# Patient Record
Sex: MALE | Race: WHITE | NOT HISPANIC OR LATINO | Employment: FULL TIME | URBAN - METROPOLITAN AREA
[De-identification: names, ages, dates, MRNs, and addresses within clinical notes are randomized per-mention and may not be internally consistent; named-entity substitution may affect disease eponyms.]

---

## 2017-05-20 ENCOUNTER — DOCUMENTATION ONLY (OUTPATIENT)
Dept: LAB | Facility: CLINIC | Age: 52
End: 2017-05-20

## 2017-05-20 DIAGNOSIS — Z12.5 SPECIAL SCREENING FOR MALIGNANT NEOPLASM OF PROSTATE: ICD-10-CM

## 2017-05-20 DIAGNOSIS — Z13.6 CARDIOVASCULAR SCREENING; LDL GOAL LESS THAN 160: Primary | ICD-10-CM

## 2017-05-20 NOTE — PROGRESS NOTES
This patient has a lab only appointment on 5/26/2017 but does not have future orders. Please review, associate diagnosis and sign pending lab orders for the upcoming appointment.  He has an appointment with Dr. Bobo on 6/2/2017.    Thank you,    [facility]

## 2017-05-23 NOTE — PROGRESS NOTES
Can someone tell me why a PSA has been prompted? Did the patient request this? There are no notes to suggest that.

## 2017-05-26 DIAGNOSIS — Z13.6 CARDIOVASCULAR SCREENING; LDL GOAL LESS THAN 160: ICD-10-CM

## 2017-05-26 DIAGNOSIS — Z12.5 SPECIAL SCREENING FOR MALIGNANT NEOPLASM OF PROSTATE: ICD-10-CM

## 2017-05-26 LAB
ALBUMIN SERPL-MCNC: 4.1 G/DL (ref 3.4–5)
ALP SERPL-CCNC: 60 U/L (ref 40–150)
ALT SERPL W P-5'-P-CCNC: 30 U/L (ref 0–70)
ANION GAP SERPL CALCULATED.3IONS-SCNC: 7 MMOL/L (ref 3–14)
AST SERPL W P-5'-P-CCNC: 19 U/L (ref 0–45)
BILIRUB SERPL-MCNC: 0.7 MG/DL (ref 0.2–1.3)
BUN SERPL-MCNC: 12 MG/DL (ref 7–30)
CALCIUM SERPL-MCNC: 9.1 MG/DL (ref 8.5–10.1)
CHLORIDE SERPL-SCNC: 105 MMOL/L (ref 94–109)
CHOLEST SERPL-MCNC: 231 MG/DL
CO2 SERPL-SCNC: 28 MMOL/L (ref 20–32)
CREAT SERPL-MCNC: 0.98 MG/DL (ref 0.66–1.25)
GFR SERPL CREATININE-BSD FRML MDRD: 81 ML/MIN/1.7M2
GLUCOSE SERPL-MCNC: 93 MG/DL (ref 70–99)
HDLC SERPL-MCNC: 58 MG/DL
LDLC SERPL CALC-MCNC: 142 MG/DL
NONHDLC SERPL-MCNC: 173 MG/DL
POTASSIUM SERPL-SCNC: 3.8 MMOL/L (ref 3.4–5.3)
PROT SERPL-MCNC: 7.6 G/DL (ref 6.8–8.8)
SODIUM SERPL-SCNC: 140 MMOL/L (ref 133–144)
TRIGL SERPL-MCNC: 154 MG/DL

## 2017-05-26 PROCEDURE — 80061 LIPID PANEL: CPT | Performed by: FAMILY MEDICINE

## 2017-05-26 PROCEDURE — 80053 COMPREHEN METABOLIC PANEL: CPT | Performed by: FAMILY MEDICINE

## 2017-05-26 PROCEDURE — 36415 COLL VENOUS BLD VENIPUNCTURE: CPT | Performed by: FAMILY MEDICINE

## 2017-05-26 NOTE — PROGRESS NOTES
I have reviewed the schedule of future appointments and this patient has an appointment scheduled for 6-2-2017.    Visit date not found

## 2017-06-02 ENCOUNTER — OFFICE VISIT (OUTPATIENT)
Dept: FAMILY MEDICINE | Facility: CLINIC | Age: 52
End: 2017-06-02
Payer: COMMERCIAL

## 2017-06-02 VITALS
TEMPERATURE: 97.6 F | WEIGHT: 192 LBS | HEIGHT: 69 IN | HEART RATE: 66 BPM | BODY MASS INDEX: 28.44 KG/M2 | SYSTOLIC BLOOD PRESSURE: 129 MMHG | DIASTOLIC BLOOD PRESSURE: 80 MMHG

## 2017-06-02 DIAGNOSIS — Z12.11 COLON CANCER SCREENING: ICD-10-CM

## 2017-06-02 DIAGNOSIS — H91.93 HEARING DIFFICULTY, BILATERAL: ICD-10-CM

## 2017-06-02 DIAGNOSIS — Z00.00 ROUTINE GENERAL MEDICAL EXAMINATION AT A HEALTH CARE FACILITY: Primary | ICD-10-CM

## 2017-06-02 PROCEDURE — 99396 PREV VISIT EST AGE 40-64: CPT | Performed by: FAMILY MEDICINE

## 2017-06-02 RX ORDER — LORATADINE 10 MG/1
10 TABLET ORAL DAILY
COMMUNITY

## 2017-06-02 NOTE — PATIENT INSTRUCTIONS
"  Preventive Health Recommendations  Male Ages 50   64    Yearly exam:             See your health care provider every year in order to  o   Review health changes.   o   Discuss preventive care.    o   Review your medicines if your doctor has prescribed any.     Have a cholesterol test every 5 years, or more frequently if you are at risk for high cholesterol/heart disease.     Have a diabetes test (fasting glucose) every three years. If you are at risk for diabetes, you should have this test more often.     Have a colonoscopy at age 50, or have a yearly FIT test (stool test). These exams will check for colon cancer.      Talk with your health care provider about whether or not a prostate cancer screening test (PSA) is right for you.    You should be tested each year for STDs (sexually transmitted diseases), if you re at risk.     Shots: Get a flu shot each year. Get a tetanus shot every 10 years.     Nutrition:    Eat at least 5 servings of fruits and vegetables daily.     Eat whole-grain bread, whole-wheat pasta and brown rice instead of white grains and rice.     Talk to your provider about Calcium and Vitamin D.     Lifestyle    Exercise for at least 150 minutes a week (30 minutes a day, 5 days a week). This will help you control your weight and prevent disease.     Limit alcohol to one drink per day.     No smoking.     Wear sunscreen to prevent skin cancer.     See your dentist every six months for an exam and cleaning.     See your eye doctor every 1 to 2 years.      Triglycerides  Does this test have other names?  Lipid panel, fasting lipoprotein panel  What is this test?  This test measures the amount of triglycerides in your blood.  Triglycerides are one of several types of fats in your blood. Other kinds are LDL (\"bad\") cholesterol and HDL (\"good\") cholesterol.  Knowing your triglyceride level is important, especially if you have diabetes, are overweight or a smoker, or are mostly inactive. High " triglyceride levels may put you at greater risk for a heart attack or stroke.    This test is part of a group of cholesterol and blood fat tests called a fasting lipoprotein panel, or lipid panel. This panel is recommended for all adults at least once every 5 years, or as recommended by your healthcare provider.  Knowing your triglyceride level helps your healthcare provider suggest healthy changes to your diet or lifestyle. If you have triglycerides that are high to very high, your provider is more likely to prescribe medicines to lower your triglycerides or your LDL cholesterol.  Why do I need this test?  You may have this test as part of a routine checkup. You may also need this test if you're overweight, drink too much alcohol, rarely exercise, or have other conditions like high blood pressure or diabetes.  If you are on cholesterol-lowering medicines, you may have this test to see how well your treatment is working.  What other tests might I have along with this test?  Your healthcare provider will order screening tests for LDL, HDL, and total cholesterol.  What do my test results mean?  Many things may affect your lab test results. These include the method each lab uses to do the test. Even if your test results are different from the normal value, you may not have a problem. To learn what the results mean for you, talk with your healthcare provider.  Results are given in milligrams per deciliter (mg/dL). Normal levels of triglycerides are less than 150 mg/dL.  Here are how higher numbers are classified:    150 to 199 mg/dL: Borderline high    200 to 499 mg/dL: High    500 mg/dL and above: Very high  If you have a high triglyceride level, you have a greater risk for heart attack and stroke.  A triglyceride level above 150 mg/dL also means that you may have an increased risk for metabolic syndrome. This is a cluster of symptoms including high blood pressure, high blood sugar, and high body fat around the waist.  These symptoms have been linked to increased risk for diabetes, heart disease, and stroke.  High triglycerides levels can also be caused by certain diseases or inherited conditions.  If your triglyceride level is above 200 mg/dL, your healthcare provider may recommend that you:    Lose weight    Limit high-fat foods containing saturated fats. These are animal fats found in meat, butter, and whole milk.    Limit trans fats, which are found in many processed foods like chips and store-bought cookies    Cut back on drinks with added sugars, such as soda    Limit your alcohol intake    Stop smoking    Control your blood pressure    Exercise for at least 30 minutes a day, 5 days a week    Limit the calories from fat in your diet to 25% to 35% of your total intake  If your triglycerides are extremely high above 500 mg/dL you may have an added risk for problems with your pancreas. You will likely need medicine to lower your levels along with recommended changes in diet and lifestyle.  How is this test done?  The test requires a blood sample, which is drawn through a needle from a vein in your arm.  Does this test pose any risks?  Taking a blood sample with a needle carries risks that include bleeding, infection, bruising, or feeling dizzy. When the needle pricks your arm, you may feel a slight stinging sensation or pain. Afterward, the site may be slightly sore.  What might affect my test results?  Not fasting for the required length of time before the test can affect your results. Certain medicines can affect your results, as can drinking alcohol.  How do I prepare for the test?  If you have this test as part of a cholesterol screening, you will need to not eat or drink anything but water for 9 to 14 hours before the test. In addition, be sure your healthcare provider knows about all medicines, herbs, vitamins, and supplements you are taking. This includes medicines that don't need a prescription and any illicit drugs you  may use.       6824-2908 [employer]. [address] [place] [address], [place] [address]. All rights reserved. This information is not intended as a substitute for professional medical care. Always follow your healthcare professional's instructions.      Your provider has referred you to: [facility]: [facility] [place] [phone]   [email or web address]    Specialty Testing:  Per audiologist

## 2017-06-02 NOTE — MR AVS SNAPSHOT
After Visit Summary   6/2/2017    Boo Medellin    MRN: [id number]           Patient Information     Date Of Birth          1965        Visit Information        Provider Department      6/2/2017 10:00 AM Germán Bobo MD [facility]        Today's Diagnoses     Routine general medical examination at a health care facility    -  1    Colon cancer screening        Hearing difficulty, bilateral          Care Instructions      Preventive Health Recommendations  Male Ages 50 - 64    Yearly exam:             See your health care provider every year in order to  o   Review health changes.   o   Discuss preventive care.    o   Review your medicines if your doctor has prescribed any.     Have a cholesterol test every 5 years, or more frequently if you are at risk for high cholesterol/heart disease.     Have a diabetes test (fasting glucose) every three years. If you are at risk for diabetes, you should have this test more often.     Have a colonoscopy at age 50, or have a yearly FIT test (stool test). These exams will check for colon cancer.      Talk with your health care provider about whether or not a prostate cancer screening test (PSA) is right for you.    You should be tested each year for STDs (sexually transmitted diseases), if you re at risk.     Shots: Get a flu shot each year. Get a tetanus shot every 10 years.     Nutrition:    Eat at least 5 servings of fruits and vegetables daily.     Eat whole-grain bread, whole-wheat pasta and brown rice instead of white grains and rice.     Talk to your provider about Calcium and Vitamin D.     Lifestyle    Exercise for at least 150 minutes a week (30 minutes a day, 5 days a week). This will help you control your weight and prevent disease.     Limit alcohol to one drink per day.     No smoking.     Wear sunscreen to prevent skin cancer.     See your dentist every six months for an exam and cleaning.     See your eye doctor every 1 to  "2 years.      Triglycerides  Does this test have other names?  Lipid panel, fasting lipoprotein panel  What is this test?  This test measures the amount of triglycerides in your blood.  Triglycerides are one of several types of fats in your blood. Other kinds are LDL (\"bad\") cholesterol and HDL (\"good\") cholesterol.  Knowing your triglyceride level is important, especially if you have diabetes, are overweight or a smoker, or are mostly inactive. High triglyceride levels may put you at greater risk for a heart attack or stroke.    This test is part of a group of cholesterol and blood fat tests called a fasting lipoprotein panel, or lipid panel. This panel is recommended for all adults at least once every 5 years, or as recommended by your healthcare provider.  Knowing your triglyceride level helps your healthcare provider suggest healthy changes to your diet or lifestyle. If you have triglycerides that are high to very high, your provider is more likely to prescribe medicines to lower your triglycerides or your LDL cholesterol.  Why do I need this test?  You may have this test as part of a routine checkup. You may also need this test if you're overweight, drink too much alcohol, rarely exercise, or have other conditions like high blood pressure or diabetes.  If you are on cholesterol-lowering medicines, you may have this test to see how well your treatment is working.  What other tests might I have along with this test?  Your healthcare provider will order screening tests for LDL, HDL, and total cholesterol.  What do my test results mean?  Many things may affect your lab test results. These include the method each lab uses to do the test. Even if your test results are different from the normal value, you may not have a problem. To learn what the results mean for you, talk with your healthcare provider.  Results are given in milligrams per deciliter (mg/dL). Normal levels of triglycerides are less than 150 " mg/dL.  Here are how higher numbers are classified:    150 to 199 mg/dL: Borderline high    200 to 499 mg/dL: High    500 mg/dL and above: Very high  If you have a high triglyceride level, you have a greater risk for heart attack and stroke.  A triglyceride level above 150 mg/dL also means that you may have an increased risk for metabolic syndrome. This is a cluster of symptoms including high blood pressure, high blood sugar, and high body fat around the waist. These symptoms have been linked to increased risk for diabetes, heart disease, and stroke.  High triglycerides levels can also be caused by certain diseases or inherited conditions.  If your triglyceride level is above 200 mg/dL, your healthcare provider may recommend that you:    Lose weight    Limit high-fat foods containing saturated fats. These are animal fats found in meat, butter, and whole milk.    Limit trans fats, which are found in many processed foods like chips and store-bought cookies    Cut back on drinks with added sugars, such as soda    Limit your alcohol intake    Stop smoking    Control your blood pressure    Exercise for at least 30 minutes a day, 5 days a week    Limit the calories from fat in your diet to 25% to 35% of your total intake  If your triglycerides are extremely high--above 500 mg/dL--you may have an added risk for problems with your pancreas. You will likely need medicine to lower your levels along with recommended changes in diet and lifestyle.  How is this test done?  The test requires a blood sample, which is drawn through a needle from a vein in your arm.  Does this test pose any risks?  Taking a blood sample with a needle carries risks that include bleeding, infection, bruising, or feeling dizzy. When the needle pricks your arm, you may feel a slight stinging sensation or pain. Afterward, the site may be slightly sore.  What might affect my test results?  Not fasting for the required length of time before the test can  affect your results. Certain medicines can affect your results, as can drinking alcohol.  How do I prepare for the test?  If you have this test as part of a cholesterol screening, you will need to not eat or drink anything but water for 9 to 14 hours before the test. In addition, be sure your healthcare provider knows about all medicines, herbs, vitamins, and supplements you are taking. This includes medicines that don't need a prescription and any illicit drugs you may use.       8072-4591 [employer]. [address] [place] [address]. All rights reserved. This information is not intended as a substitute for professional medical care. Always follow your healthcare professional's instructions.      Your provider has referred you to: [facility]: [facility] [phone]   [email or web address]    Specialty Testing:  Per audiologist            Follow-ups after your visit        Additional Services     AUDIOLOGY ADULT REFERRAL       Your provider has referred you to: [facility]: [facility] [phone]   [email or web address]    Specialty Testing:  Per audiologist                  Follow-up notes from your care team     Return in about 1 year (around 6/2/2018) for Physical Exam.      Future tests that were ordered for you today     Open Future Orders        Priority Expected Expires Ordered    Fecal colorectal cancer screen FIT Routine 6/23/2017 8/25/2017 6/2/2017            Who to contact     If you have questions or need follow up information about today's clinic visit or your schedule please contact [facility] directly at [phone].  Normal or non-critical lab and imaging results will be communicated to you by MyChart, letter or phone within 4 business days after the clinic has received the results. If you do not hear from us within 7 days, please contact the clinic through MyChart or phone. If  "you have a critical or abnormal lab result, we will notify you by phone as soon as possible.  Submit refill requests through [employer] or call your pharmacy and they will forward the refill request to us. Please allow 3 business days for your refill to be completed.          Additional Information About Your Visit        [employer] Information     [employer] lets you send messages to your doctor, view your test results, renew your prescriptions, schedule appointments and more. To sign up, go to [email or web address] . Click on \"Log in\" on the left side of the screen, which will take you to the Welcome page. Then click on \"Sign up Now\" on the right side of the page.     You will be asked to enter the access code listed below, as well as some personal information. Please follow the directions to create your username and password.     Your access code is: [email or web address]  Expires: 2017  7:59 PM     Your access code will  in 90 days. If you need help or a new code, please call your [facility] or [phone].        Care EveryWhere ID     This is your Care EveryWhere ID. This could be used by other organizations to access your [facility] medical records  [id number]        Your Vitals Were     Pulse Temperature Height BMI (Body Mass Index)          66 97.6  F (36.4  C) (Oral) 5' 8.75\" (1.746 m) 28.56 kg/m2         Blood Pressure from Last 3 Encounters:   17 129/80   12/21/15 118/77   14 125/79    Weight from Last 3 Encounters:   17 192 lb (87.1 kg)   12/21/15 187 lb 12.8 oz (85.2 kg)   14 189 lb (85.7 kg)              We Performed the Following     AUDIOLOGY ADULT REFERRAL        Primary Care Provider Office Phone # Fax #    Germán Bobo [phone]       [facility] [address] [address] [facility] [address]        Thank you!     Thank you for choosing [facility]  for your care. Our goal is always to provide you with excellent care. Hearing back " from our patients is one way we can continue to improve our services. Please take a few minutes to complete the written survey that you may receive in the mail after your visit with us. Thank you!             Your Updated Medication List - Protect others around you: Learn how to safely use, store and throw away your medicines at www.disposemymeds.org.          This list is accurate as of: 6/2/17 10:52 AM.  Always use your most recent med list.                   Brand Name Dispense Instructions for use    Fish Oil 1200 MG Caps      Take 2 capsules by mouth daily.       loratadine 10 MG tablet    CLARITIN     Take 10 mg by mouth daily       Multi-vitamin Tabs tablet   Generic drug:  multivitamin, therapeutic with minerals      Take 1 tablet by mouth daily.

## 2017-06-02 NOTE — NURSING NOTE
"Chief Complaint   Patient presents with     Physical       Initial /80  Pulse 66  Temp 97.6  F (36.4  C) (Oral)  Ht 5' 8.75\" (1.746 m)  Wt 192 lb (87.1 kg)  BMI 28.56 kg/m2 Estimated body mass index is 28.56 kg/(m^2) as calculated from the following:    Height as of this encounter: 5' 8.75\" (1.746 m).    Weight as of this encounter: 192 lb (87.1 kg).  Medication Reconciliation: dimas Olguin M.A.      "

## 2017-06-02 NOTE — PROGRESS NOTES
SUBJECTIVE:     CC: Boo Medellin is an 51 year old male who presents for preventative health visit.     Healthy Habits:    Do you get at least three servings of calcium containing foods daily (dairy, green leafy vegetables, etc.)? yes    Amount of exercise or daily activities, outside of work: 1 day(s) per week    Problems taking medications regularly not applicable    Medication side effects: No    Have you had an eye exam in the past two years? yes    Do you see a dentist twice per year? yes    Do you have sleep apnea, excessive snoring or daytime drowsiness?no            Today's PHQ-2 Score:   PHQ-2 ( 1999 Pfizer) 12/21/2015 11/24/2014   Q1: Little interest or pleasure in doing things 0 0   Q2: Feeling down, depressed or hopeless 0 0   PHQ-2 Score 0 0       Abuse: Current or Past(Physical, Sexual or Emotional)- NO  Do you feel safe in your environment - YES    Social History   Substance Use Topics     Smoking status: Never Smoker     Smokeless tobacco: Never Used     Alcohol use Yes     The patient does not drink >3 drinks per day nor >7 drinks per week.    Last PSA: No results found for: PSA    Recent Labs   Lab Test  05/26/17   0735  11/27/15   0800  11/17/14   0730  11/21/13   0906   CHOL  231*  216*  216*  220*   HDL  58  51  51  42   LDL  142*  120*  137*  144*   TRIG  154*  223*  140  172*   CHOLHDLRATIO   --    --   4.2  5.3*   NHDL  173*  165*   --    --        Reviewed orders with patient. Reviewed health maintenance and updated orders accordingly - Yes    Reviewed and updated as needed this visit by clinical staff  Tobacco  Allergies  Meds  Med Hx  Surg Hx  Fam Hx  Soc Hx        Reviewed and updated as needed this visit by Provider            ROS:    ENT: NEGATIVE for ear, mouth and throat problems except hearing in the office has been more difficulty in the last few years           ASSESSMENT/PLAN:         ICD-10-CM    1. Routine general medical examination at a health care facility Z00.00  "Fecal colorectal cancer screen FIT   2. Colon cancer screening Z12.11 Fecal colorectal cancer screen FIT   3. Hearing difficulty, bilateral H91.93 AUDIOLOGY ADULT REFERRAL       COUNSELING:  Reviewed preventive health counseling, as reflected in patient instructions       Regular exercise       Vision screening       Family planning       Consider Hep C screening for patients born between 1945 and 1965       Osteoporosis Prevention/Bone Health    BP Screening:   Last 3 BP Readings:    BP Readings from Last 3 Encounters:   06/02/17 129/80   12/21/15 118/77   11/24/14 125/79       The following was recommended to the patient:  Re-screen BP within a year and recommended lifestyle modifications     reports that he has never smoked. He has never used smokeless tobacco.    Estimated body mass index is 28.56 kg/(m^2) as calculated from the following:    Height as of this encounter: 5' 8.75\" (1.746 m).    Weight as of this encounter: 192 lb (87.1 kg).   Weight management plan: Discussed healthy diet and exercise guidelines and patient will follow up in 12 months in clinic to re-evaluate.    Counseling Resources:  ATP IV Guidelines  Pooled Cohorts Equation Calculator  FRAX Risk Assessment  ICSI Preventive Guidelines  Dietary Guidelines for Americans, 2010  USDA's MyPlate  ASA Prophylaxis  Lung CA Screening    Germán Bobo MD  [facility]  --------------------------------------------------------------------------------------------------------------------------------------    SUBJECTIVE:  Boo Medellin is a 51 year old male who presents to the clinic today for a routine physical exam.    The patient's last physical was 1 years ago.     Cholesterol   Date Value Ref Range Status   05/26/2017 231 (H) <200 mg/dL Final     Comment:     Desirable:       <200 mg/dl   11/27/2015 216 (H) <200 mg/dL Final     Comment:     Desirable:       <200 mg/dl     HDL Cholesterol   Date Value Ref Range Status   05/26/2017 58 " >39 mg/dL Final   11/27/2015 51 >39 mg/dL Final     LDL Cholesterol Calculated   Date Value Ref Range Status   05/26/2017 142 (H) <100 mg/dL Final     Comment:     Above desirable:  100-129 mg/dl   Borderline High:  130-159 mg/dL   High:             160-189 mg/dL   Very high:       >189 mg/dl     11/27/2015 120 (H) <100 mg/dL Final     Comment:     Above desirable:  100-129 mg/dl   Borderline High:  130-159 mg/dL   High:             160-189 mg/dL   Very high:       >189 mg/dl       Triglycerides   Date Value Ref Range Status   05/26/2017 154 (H) <150 mg/dL Final     Comment:     Borderline high:  150-199 mg/dl   High:             200-499 mg/dl   Very high:       >499 mg/dl   Fasting specimen     11/27/2015 223 (H) <150 mg/dL Final     Comment:     Borderline high:  150-199 mg/dl   High:             200-499 mg/dl   Very high:       >499 mg/dl   Fasting specimen       Cholesterol/HDL Ratio   Date Value Ref Range Status   11/17/2014 4.2 0.0 - 5.0 Final   11/21/2013 5.3 (H) 0.0 - 5.0 Final     The patient's last fasting lipid panel was done 1 weeks ago and the results are listed above.        The 10-year ASCVD risk score (Rice KAILEY Jr, et al., 2013) is: 4.1%    Values used to calculate the score:      Age: 51 years      Sex: Male      Is Non- : No      Diabetic: No      Tobacco smoker: No      Systolic Blood Pressure: 129 mmHg      Is BP treated: No      HDL Cholesterol: 58 mg/dL      Total Cholesterol: 231 mg/dL        The patient reports that he has never been treated for high blood pressure.    The patient reports that he does not take a daily aspirin.    Lab Results   Component Value Date    HCVAB Negative 10/09/2012     The patient reports that he has been screened for Hepatitis C    (Screen all baby boomers once per CDC-- the generation born from 1945 through 1965)    Immunization History   Administered Date(s) Administered     Influenza (IIV3) 09/23/2011, 01/13/2013     TD (ADULT, 7+)  01/13/2003     TDAP Vaccine (Adacel) 09/23/2011     The patient's believes that his last tetanus shot was given 6 year(s) ago.   The patient believes that he has not had a Zostavax in the past  The patient believes that he has not had a PPSV23 in the past.  The patient believes that he has not had a PCV13 in the past.  The patient believes that he has had a seasonal flu vaccination this fall or winter.  The patient would like to have a no vaccinations today      No results found for this or any previous visit.]   The patient denies a family history of colon cancer.  The patient reports that he has not had a colonoscopy. He has done the FIT card in the past and wished to continue(s) that .      The patient reports that he does performs a self testicular exam monthly.  His currently used contraception is a previous vasectomy in the near future.  The patient denies a family history of diabetes.    The patient reports that he eats or drinks 3-5 servings of dairy products per day. He does take a multivitamin with calcium once daily.  The patient reports that he has dental appointments approximately every 6 months.  The patient reports that he  has an eye examination approximately every 1.0 year(s).    Do you currently smoke? No  How many years have you smoked? 0   How many packs per day did you smoke on average? N/A  (if more than 30 pack year history and the patient is age 55-80 consider ordering an annual low dose radiation lung CT to screen for cancer)  (Do not order if patient has quit more than 15 years ago or has a health condition that limits life expectancy or could not tolerate curative lung surgery)  Are you interested having a lung CT to screen for lung cancer? N/A    If the patient has smoked more that 100 cigarettes, has the patient had an imaging study (US or CT) for an AAA between the ages of 65 and 75? N/A            Patient Active Problem List   Diagnosis     HDL lipoprotein deficiency     AR (allergic  "rhinitis)     Benign localized hyperplasia of prostate without urinary obstruction and other lower urinary tract symptoms (LUTS)     High triglycerides     CARDIOVASCULAR SCREENING; LDL GOAL LESS THAN 160       Past Surgical History:   Procedure Laterality Date     HERNIA REPAIR, INGUINAL RT/LT  1967    right       Family History   Problem Relation Age of Onset     Cardiovascular Mother      Alzheimer Disease Mother      Cardiovascular Father      heart attack / pacemaker     CEREBROVASCULAR DISEASE Father      C.A.D. Father      MI at age 64     CANCER Father      central chest     Alzheimer Disease Maternal Grandmother      Cancer - colorectal Maternal Grandfather      at age 85     Alzheimer Disease Paternal Grandfather      Cardiovascular Brother      Alzheimer Disease Paternal Grandmother      Prostate Cancer No family hx of      Anesthesia Reaction No family hx of      Blood Disease No family hx of        Social History     Social History     Marital status:      Spouse name: N/A     Number of children: N/A     Years of education: N/A     Occupational History     Not on file.     Social History Main Topics     Smoking status: Never Smoker     Smokeless tobacco: Never Used     Alcohol use Yes     Drug use: No     Sexual activity: Yes     Partners: Female     Birth control/ protection: Surgical     Other Topics Concern     Not on file     Social History Narrative       Current Outpatient Prescriptions   Medication Sig Dispense Refill     loratadine (CLARITIN) 10 MG tablet Take 10 mg by mouth daily       Multiple Vitamin (MULTI-VITAMIN) per tablet Take 1 tablet by mouth daily.       Omega-3 Fatty Acids (FISH OIL) 1200 MG capsule Take 2 capsules by mouth daily.       PHYSICAL EXAMINATION:  Blood pressure 129/80, pulse 66, temperature 97.6  F (36.4  C), temperature source Oral, height 5' 8.75\" (1.746 m), weight 192 lb (87.1 kg).  General appearance - healthy, alert and no distress  Skin - Skin color, " texture, turgor normal. No rashes or lesions.  Head - Normocephalic. No masses, lesions, tenderness or abnormalities  Eyes - conjunctivae/corneas clear. PERRL, EOM's intact. Fundi benign  Ears - External ears normal. Canals clear. TM's normal.  Nose/Sinuses - Nares normal. Septum midline. Mucosa normal. No drainage or sinus tenderness.  Oropharynx - Lips, mucosa, and tongue normal. Teeth and gums normal.  Neck - Neck supple. No adenopathy. Thyroid symmetric, normal size,  Lungs - Percussion normal. Good diaphragmatic excursion. Lungs clear  Heart - PMI normal. No lifts, heaves, or thrills. RRR. No murmurs, clicks gallops or rub  Abdomen - Abdomen soft, non-tender. BS normal. No masses, organomegaly  Extremities - Extremities normal. No deformities, edema, or skin discoloration.  Musculoskeletal - Spine ROM normal. Muscular strength intact.  Peripheral pulses - radial=4/4, femoral=4/4, popliteal=4/4, dorsalis pedis=4/4,  Neuro - Gait normal. Reflexes normal and symmetric. Sensation grossly WNL.  Genitalia - Penis normal. No urethral discharge. Scrotum normal to palpation. No hernia.  Rectal - Rectal negative. Prostate palpation negative.  No rectal masses or abnormalities, positive findings: prostate 1+      Orders Only on 05/26/2017   Component Date Value Ref Range Status     Cholesterol 05/26/2017 231* <200 mg/dL Final    Desirable:       <200 mg/dl     Triglycerides 05/26/2017 154* <150 mg/dL Final    Comment: Borderline high:  150-199 mg/dl   High:             200-499 mg/dl   Very high:       >499 mg/dl   Fasting specimen       HDL Cholesterol 05/26/2017 58  >39 mg/dL Final     LDL Cholesterol Calculated 05/26/2017 142* <100 mg/dL Final    Comment: Above desirable:  100-129 mg/dl   Borderline High:  130-159 mg/dL   High:             160-189 mg/dL   Very high:       >189 mg/dl       Non HDL Cholesterol 05/26/2017 173* <130 mg/dL Final    Comment: Above Desirable:  130-159 mg/dl   Borderline high:  160-189 mg/dl    High:             190-219 mg/dl   Very high:       >219 mg/dl       Sodium 05/26/2017 140  133 - 144 mmol/L Final     Potassium 05/26/2017 3.8  3.4 - 5.3 mmol/L Final     Chloride 05/26/2017 105  94 - 109 mmol/L Final     Carbon Dioxide 05/26/2017 28  20 - 32 mmol/L Final     Anion Gap 05/26/2017 7  3 - 14 mmol/L Final     Glucose 05/26/2017 93  70 - 99 mg/dL Final    Fasting specimen     Urea Nitrogen 05/26/2017 12  7 - 30 mg/dL Final     Creatinine 05/26/2017 0.98  0.66 - 1.25 mg/dL Final     GFR Estimate 05/26/2017 81  >60 mL/min/1.7m2 Final    Non  GFR Calc     GFR Estimate If Black 05/26/2017   >60 mL/min/1.7m2 Final                    Value:>90   GFR Calc       Calcium 05/26/2017 9.1  8.5 - 10.1 mg/dL Final     Bilirubin Total 05/26/2017 0.7  0.2 - 1.3 mg/dL Final     Albumin 05/26/2017 4.1  3.4 - 5.0 g/dL Final     Protein Total 05/26/2017 7.6  6.8 - 8.8 g/dL Final     Alkaline Phosphatase 05/26/2017 60  40 - 150 U/L Final     ALT 05/26/2017 30  0 - 70 U/L Final     AST 05/26/2017 19  0 - 45 U/L Final       ASSESSMENT:    ICD-10-CM    1. Routine general medical examination at a health care facility Z00.00        Well-Adult Physical Exam.  Health Maintenance Due   Topic Date Due     FIT Q1 YR  03/14/2017     Health Maintenance   Topic Date Due     FIT Q1 YR  03/14/2017     INFLUENZA VACCINE (SYSTEM ASSIGNED)  09/01/2017     TETANUS IMMUNIZATION (SYSTEM ASSIGNED)  09/23/2021     LIPID SCREEN Q5 YR MALE (SYSTEM ASSIGNED)  05/26/2022     HEPATITIS C SCREENING  Completed         HEALTH CARE MAINTENENCE: The recommended screening tests and vaccinatons for this patient have been discussed as above.  The appropriate tests and vaccinations  have been ordered or declined by the patient. Please see the orders in EPIC.The patient specifically declines: n/a     Immunization Status:  up to date and documented    Patient Active Problem List   Diagnosis     HDL lipoprotein deficiency     AR  (allergic rhinitis)     Benign localized hyperplasia of prostate without urinary obstruction and other lower urinary tract symptoms (LUTS)     High triglycerides     CARDIOVASCULAR SCREENING; LDL GOAL LESS THAN 160        ATP III Guidelines  ICSI Preventive Guidelines    PLAN:   FIT/stool screen for colon cancer recommended  Testicular self-exam encouraged  Discussed calcium intake, vitamins and supplements. Recommended 1000 mg of calcium daily  Sunscreen use was recommended especially in the area of tatoos  Recommended dental exams every 6 months  Recommended eye exam every 1-2 years  Follow up in 1 year for the next preventative medical visit

## 2017-08-03 PROCEDURE — 82274 ASSAY TEST FOR BLOOD FECAL: CPT | Performed by: FAMILY MEDICINE

## 2017-08-06 DIAGNOSIS — Z00.00 ROUTINE GENERAL MEDICAL EXAMINATION AT A HEALTH CARE FACILITY: ICD-10-CM

## 2017-08-06 DIAGNOSIS — Z12.11 COLON CANCER SCREENING: ICD-10-CM

## 2017-08-06 LAB — HEMOCCULT STL QL IA: NEGATIVE

## 2018-08-20 ENCOUNTER — DOCUMENTATION ONLY (OUTPATIENT)
Dept: LAB | Facility: CLINIC | Age: 53
End: 2018-08-20

## 2018-08-20 DIAGNOSIS — Z13.6 CARDIOVASCULAR SCREENING; LDL GOAL LESS THAN 160: Primary | ICD-10-CM

## 2018-08-20 DIAGNOSIS — E78.6 HDL LIPOPROTEIN DEFICIENCY: ICD-10-CM

## 2018-08-20 DIAGNOSIS — E78.1 HIGH TRIGLYCERIDES: ICD-10-CM

## 2018-08-20 DIAGNOSIS — N40.0 BPH WITHOUT OBSTRUCTION/LOWER URINARY TRACT SYMPTOMS: ICD-10-CM

## 2018-08-20 NOTE — PROGRESS NOTES
Need previsit labs-see orders and close encounter if nothing else needed./Loree Bang,       Physical 8/24/18

## 2018-08-20 NOTE — PROGRESS NOTES
.Please place or confirm pending lab orders for upcoming lab appointment on 8/21/18  Thank you,  Trudy

## 2018-08-21 DIAGNOSIS — E78.6 HDL LIPOPROTEIN DEFICIENCY: ICD-10-CM

## 2018-08-21 DIAGNOSIS — Z13.6 CARDIOVASCULAR SCREENING; LDL GOAL LESS THAN 160: ICD-10-CM

## 2018-08-21 DIAGNOSIS — E78.1 HIGH TRIGLYCERIDES: ICD-10-CM

## 2018-08-21 DIAGNOSIS — N40.0 BPH WITHOUT OBSTRUCTION/LOWER URINARY TRACT SYMPTOMS: ICD-10-CM

## 2018-08-21 LAB
ALBUMIN SERPL-MCNC: 4.1 G/DL (ref 3.4–5)
ALP SERPL-CCNC: 58 U/L (ref 40–150)
ALT SERPL W P-5'-P-CCNC: 21 U/L (ref 0–70)
ANION GAP SERPL CALCULATED.3IONS-SCNC: 8 MMOL/L (ref 3–14)
AST SERPL W P-5'-P-CCNC: 20 U/L (ref 0–45)
BILIRUB SERPL-MCNC: 0.9 MG/DL (ref 0.2–1.3)
BUN SERPL-MCNC: 13 MG/DL (ref 7–30)
CALCIUM SERPL-MCNC: 9 MG/DL (ref 8.5–10.1)
CHLORIDE SERPL-SCNC: 106 MMOL/L (ref 94–109)
CHOLEST SERPL-MCNC: 220 MG/DL
CO2 SERPL-SCNC: 26 MMOL/L (ref 20–32)
CREAT SERPL-MCNC: 1.05 MG/DL (ref 0.66–1.25)
GFR SERPL CREATININE-BSD FRML MDRD: 74 ML/MIN/1.7M2
GLUCOSE SERPL-MCNC: 89 MG/DL (ref 70–99)
HDLC SERPL-MCNC: 55 MG/DL
LDLC SERPL CALC-MCNC: 135 MG/DL
NONHDLC SERPL-MCNC: 165 MG/DL
POTASSIUM SERPL-SCNC: 4.4 MMOL/L (ref 3.4–5.3)
PROT SERPL-MCNC: 7.7 G/DL (ref 6.8–8.8)
SODIUM SERPL-SCNC: 140 MMOL/L (ref 133–144)
TRIGL SERPL-MCNC: 148 MG/DL

## 2018-08-21 PROCEDURE — 80061 LIPID PANEL: CPT | Performed by: FAMILY MEDICINE

## 2018-08-21 PROCEDURE — 80053 COMPREHEN METABOLIC PANEL: CPT | Performed by: FAMILY MEDICINE

## 2018-08-21 PROCEDURE — 36415 COLL VENOUS BLD VENIPUNCTURE: CPT | Performed by: FAMILY MEDICINE

## 2018-08-22 NOTE — PROGRESS NOTES
I have reviewed the schedule of future appointments and this patient has an appointment scheduled for 8-.    Visit date not found

## 2018-08-24 ENCOUNTER — OFFICE VISIT (OUTPATIENT)
Dept: FAMILY MEDICINE | Facility: CLINIC | Age: 53
End: 2018-08-24
Payer: COMMERCIAL

## 2018-08-24 VITALS
BODY MASS INDEX: 27.55 KG/M2 | HEIGHT: 69 IN | OXYGEN SATURATION: 98 % | TEMPERATURE: 97.4 F | WEIGHT: 186 LBS | DIASTOLIC BLOOD PRESSURE: 75 MMHG | RESPIRATION RATE: 16 BRPM | SYSTOLIC BLOOD PRESSURE: 115 MMHG | HEART RATE: 52 BPM

## 2018-08-24 DIAGNOSIS — Z12.11 SCREEN FOR COLON CANCER: ICD-10-CM

## 2018-08-24 DIAGNOSIS — Z11.4 SCREENING FOR HIV (HUMAN IMMUNODEFICIENCY VIRUS): ICD-10-CM

## 2018-08-24 DIAGNOSIS — Z00.00 ROUTINE GENERAL MEDICAL EXAMINATION AT A HEALTH CARE FACILITY: Primary | ICD-10-CM

## 2018-08-24 PROCEDURE — 99396 PREV VISIT EST AGE 40-64: CPT | Performed by: FAMILY MEDICINE

## 2018-08-24 ASSESSMENT — ENCOUNTER SYMPTOMS
SORE THROAT: 0
ARTHRALGIAS: 0
FREQUENCY: 0
JOINT SWELLING: 0
SHORTNESS OF BREATH: 0
EYE PAIN: 0
NAUSEA: 0
WEAKNESS: 0
HEADACHES: 0
ABDOMINAL PAIN: 0
NERVOUS/ANXIOUS: 0
FEVER: 0
PARESTHESIAS: 0
COUGH: 0
CONSTIPATION: 0
PALPITATIONS: 0
CHILLS: 0
HEARTBURN: 0
MYALGIAS: 0
HEMATOCHEZIA: 0
HEMATURIA: 0
DIZZINESS: 0
DIARRHEA: 0
DYSURIA: 0

## 2018-08-24 ASSESSMENT — PAIN SCALES - GENERAL: PAINLEVEL: NO PAIN (0)

## 2018-08-24 NOTE — NURSING NOTE
"Chief Complaint   Patient presents with     Physical     Health Maintenance     orders pended, PHQ-2       Initial /75  Pulse 52  Temp 97.4  F (36.3  C) (Oral)  Resp 16  Ht 5' 9\" (1.753 m)  Wt 186 lb (84.4 kg)  SpO2 98%  BMI 27.47 kg/m2 Estimated body mass index is 27.47 kg/(m^2) as calculated from the following:    Height as of this encounter: 5' 9\" (1.753 m).    Weight as of this encounter: 186 lb (84.4 kg).  Medication Reconciliation: complete  Celine Whalen, [facility]  "

## 2018-08-24 NOTE — MR AVS SNAPSHOT
After Visit Summary   8/24/2018    Boo Medellin    MRN: [id number]           Patient Information     Date Of Birth          1965        Visit Information        Provider Department      8/24/2018 9:00 AM Germán Bobo MD [facility] [place]        Today's Diagnoses     Routine general medical examination at a health care facility    -  1    Screen for colon cancer        Screening for HIV (human immunodeficiency virus)          Care Instructions      Preventive Health Recommendations  Male Ages 50 - 64    Yearly exam:             See your health care provider every year in order to  o   Review health changes.   o   Discuss preventive care.    o   Review your medicines if your doctor has prescribed any.     Have a cholesterol test every 5 years, or more frequently if you are at risk for high cholesterol/heart disease.     Have a diabetes test (fasting glucose) every three years. If you are at risk for diabetes, you should have this test more often.     Have a colonoscopy at age 50, or have a yearly FIT test (stool test). These exams will check for colon cancer.      Talk with your health care provider about whether or not a prostate cancer screening test (PSA) is right for you.    You should be tested each year for STDs (sexually transmitted diseases), if you re at risk.     Shots: Get a flu shot each year. Get a tetanus shot every 10 years.     Nutrition:    Eat at least 5 servings of fruits and vegetables daily.     Eat whole-grain bread, whole-wheat pasta and brown rice instead of white grains and rice.     Get adequate Calcium and Vitamin D.     Lifestyle    Exercise for at least 150 minutes a week (30 minutes a day, 5 days a week). This will help you control your weight and prevent disease.     Limit alcohol to one drink per day.     No smoking.     Wear sunscreen to prevent skin cancer.     See your dentist every six months for an exam and cleaning.     See your eye doctor every  "1 to 2 years.            Follow-ups after your visit        Future tests that were ordered for you today     Open Future Orders        Priority Expected Expires Ordered    Fecal colorectal cancer screen FIT - Future (S+30) Routine 9/12/2018 9/21/2018 8/24/2018            Who to contact     If you have questions or need follow up information about today's clinic visit or your schedule please contact [facility] directly at [phone].  Normal or non-critical lab and imaging results will be communicated to you by [employer], letter or phone within 4 business days after the clinic has received the results. If you do not hear from us within 7 days, please contact the clinic through [employer] or phone. If you have a critical or abnormal lab result, we will notify you by phone as soon as possible.  Submit refill requests through [employer] or call your pharmacy and they will forward the refill request to us. Please allow 3 business days for your refill to be completed.          Additional Information About Your Visit        [employer] Information     [employer] gives you secure access to your electronic health record. If you see a primary care provider, you can also send messages to your care team and make appointments. If you have questions, please call your primary care clinic.  If you do not have a primary care provider, please call [phone] and they will assist you.        Care EveryWhere ID     This is your Care EveryWhere ID. This could be used by other organizations to access your [place] medical records  [id number]        Your Vitals Were     Pulse Temperature Respirations Height Pulse Oximetry BMI (Body Mass Index)    52 97.4  F (36.3  C) (Oral) 16 5' 9\" (1.753 m) 98% 27.47 kg/m2       Blood Pressure from Last 3 Encounters:   08/24/18 115/75   06/02/17 129/80   12/21/15 118/77    Weight from Last 3 Encounters:   08/24/18 186 lb (84.4 kg)   06/02/17 192 lb (87.1 kg)   12/21/15 187 lb 12.8 oz (85.2 kg)    "            Primary Care Provider Office Phone # Fax #    Germán Bobo [phone] [address] [facility] [address]        Equal Access to Services     MARY GOODRICH : Edwin fermin guillermo Andre, wajaysonda luqadaha, qaybta kaalmada sarbjit, josé miguel magana salvadoraundrea encinas laEundolly fuchs. So [facility] [phone].    ATENCIÓN: Si habla español, tiene a perla disposición servicios gratuitos de asistencia lingüística. Llame al [phone].    We comply with applicable federal civil rights laws and [place] laws. We do not discriminate on the basis of race, color, national origin, age, disability, sex, sexual orientation, or gender identity.            Thank you!     Thank you for choosing [facility]  for your care. Our goal is always to provide you with excellent care. Hearing back from our patients is one way we can continue to improve our services. Please take a few minutes to complete the written survey that you may receive in the mail after your visit with us. Thank you!             Your Updated Medication List - Protect others around you: Learn how to safely use, store and throw away your medicines at www.disposemymeds.org.          This list is accurate as of 8/24/18  9:42 AM.  Always use your most recent med list.                   Brand Name Dispense Instructions for use Diagnosis    fish Oil 1200 MG capsule      Take 2 capsules by mouth daily.        loratadine 10 MG tablet    CLARITIN     Take 10 mg by mouth daily        Multi-vitamin Tabs tablet   Generic drug:  multivitamin, therapeutic with minerals      Take 1 tablet by mouth daily.

## 2018-10-29 DIAGNOSIS — Z12.11 SCREEN FOR COLON CANCER: ICD-10-CM

## 2018-10-29 PROCEDURE — 82274 ASSAY TEST FOR BLOOD FECAL: CPT | Performed by: FAMILY MEDICINE

## 2018-11-01 LAB — HEMOCCULT STL QL IA: NEGATIVE

## 2018-11-02 NOTE — PROGRESS NOTES
Boo,  I have reviewed the results of the laboratory tests that we recently ordered. All of the lab work performed was normal or considered normal for you.  Sincerely,   Germán Bobo

## 2018-12-03 ENCOUNTER — TELEPHONE (OUTPATIENT)
Dept: FAMILY MEDICINE | Facility: CLINIC | Age: 53
End: 2018-12-03

## 2018-12-03 NOTE — TELEPHONE ENCOUNTER
Pt wife brought in form to fill out for reimbursement from ins company.      Please fill out and fax to:  [phone]    Thank you

## 2018-12-04 NOTE — TELEPHONE ENCOUNTER
I faxed the form to [employer] [phone].  I spoke to the patient and I let him know.  Amee Cheek,

## 2020-02-23 ENCOUNTER — HEALTH MAINTENANCE LETTER (OUTPATIENT)
Age: 55
End: 2020-02-23

## 2020-10-27 ENCOUNTER — TELEPHONE (OUTPATIENT)
Dept: FAMILY MEDICINE | Facility: CLINIC | Age: 55
End: 2020-10-27

## 2020-10-27 DIAGNOSIS — H91.93 HEARING DIFFICULTY OF BOTH EARS: Primary | ICD-10-CM

## 2020-10-27 DIAGNOSIS — H93.13 TINNITUS, BILATERAL: ICD-10-CM

## 2020-10-27 NOTE — TELEPHONE ENCOUNTER
RN returned call to pt regarding Audiology Referral request.  Pt states he has noticed hearing loss bilaterally and states lt ear is worse than rt ear.     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     Pt reports he experiences daily tinnitis intermittently throughout the day.   Pt states everyday conversations are becoming more difficult so he would like a referral for reevaluation and tx options.   Please place requested referral or advise.    JENN LuceroN, RN

## 2020-10-27 NOTE — TELEPHONE ENCOUNTER
Patient would like an referral to audiology for Hearing difficulty, bilateral .  Please call patient when ready, so he is able to make an appointment.  Thank you  Caller informed that calls received after 3pm may not be returned same day.

## 2020-10-29 NOTE — TELEPHONE ENCOUNTER
Perhaps he should see EAR NOSE AND THROAT to clarify the problem first.   He should call specialty scheduling at [phone] to schedule the  appointment.   (Orthopedic(s) has its own phone number of [phone])

## 2020-10-29 NOTE — TELEPHONE ENCOUNTER
Called and informed patient of Dr Bobo's message. He said he does not know why he would need to see the orthopedist. He will call for the ENT appointment.Jyotsna Ortega MA/JACQUELYN

## 2020-12-13 ENCOUNTER — HEALTH MAINTENANCE LETTER (OUTPATIENT)
Age: 55
End: 2020-12-13

## 2021-03-18 ENCOUNTER — OFFICE VISIT (OUTPATIENT)
Dept: FAMILY MEDICINE | Facility: CLINIC | Age: 56
End: 2021-03-18
Payer: COMMERCIAL

## 2021-03-18 VITALS
SYSTOLIC BLOOD PRESSURE: 132 MMHG | WEIGHT: 193 LBS | BODY MASS INDEX: 28.58 KG/M2 | HEART RATE: 71 BPM | OXYGEN SATURATION: 98 % | HEIGHT: 69 IN | RESPIRATION RATE: 16 BRPM | DIASTOLIC BLOOD PRESSURE: 83 MMHG

## 2021-03-18 DIAGNOSIS — Z00.00 ROUTINE GENERAL MEDICAL EXAMINATION AT A HEALTH CARE FACILITY: Primary | ICD-10-CM

## 2021-03-18 DIAGNOSIS — Z12.9 CANCER SCREENING: ICD-10-CM

## 2021-03-18 DIAGNOSIS — Z23 NEED FOR VACCINATION: ICD-10-CM

## 2021-03-18 DIAGNOSIS — H91.93 BILATERAL HEARING LOSS, UNSPECIFIED HEARING LOSS TYPE: ICD-10-CM

## 2021-03-18 PROBLEM — N40.0 BPH WITHOUT OBSTRUCTION/LOWER URINARY TRACT SYMPTOMS: Status: RESOLVED | Noted: 2018-08-20 | Resolved: 2021-03-18

## 2021-03-18 PROCEDURE — 90715 TDAP VACCINE 7 YRS/> IM: CPT | Performed by: PHYSICIAN ASSISTANT

## 2021-03-18 PROCEDURE — 90471 IMMUNIZATION ADMIN: CPT | Performed by: PHYSICIAN ASSISTANT

## 2021-03-18 PROCEDURE — 99396 PREV VISIT EST AGE 40-64: CPT | Mod: 25 | Performed by: PHYSICIAN ASSISTANT

## 2021-03-18 ASSESSMENT — ENCOUNTER SYMPTOMS
CONSTIPATION: 0
SHORTNESS OF BREATH: 0
FREQUENCY: 0
DIARRHEA: 0
ARTHRALGIAS: 0
ABDOMINAL PAIN: 0
JOINT SWELLING: 0
DIZZINESS: 0
HEADACHES: 0
FEVER: 0
EYE PAIN: 0
DYSURIA: 0
WEAKNESS: 0
PALPITATIONS: 0
PARESTHESIAS: 0
COUGH: 0
HEMATOCHEZIA: 0
NAUSEA: 0
NERVOUS/ANXIOUS: 0
HEARTBURN: 0
CHILLS: 0
HEMATURIA: 0
MYALGIAS: 0
SORE THROAT: 0

## 2021-03-18 ASSESSMENT — MIFFLIN-ST. JEOR: SCORE: 1700.82

## 2021-03-18 NOTE — PROGRESS NOTES
SUBJECTIVE:   CC: Boo Medellin is an 55 year old male who presents for preventative health visit.       Patient has been advised of split billing requirements and indicates understanding: Yes  Healthy Habits:     Getting at least 3 servings of Calcium per day:  Yes    Bi-annual eye exam:  Yes    Dental care twice a year:  Yes    Sleep apnea or symptoms of sleep apnea:  Excessive snoring    Diet:  Regular (no restrictions)    Frequency of exercise:  4-5 days/week    Duration of exercise:  30-45 minutes    Taking medications regularly:  Yes    Medication side effects:  Not applicable and None    PHQ-2 Total Score: 0    Additional concerns today:  No    No concerns     History of hearing loss and uses hearing aid that are new in the last 6 months.     Today's PHQ-2 Score:   PHQ-2 ( 1999 Pfizer) 3/18/2021   Q1: Little interest or pleasure in doing things 0   Q2: Feeling down, depressed or hopeless 0   PHQ-2 Score 0   Q1: Little interest or pleasure in doing things Not at all   Q2: Feeling down, depressed or hopeless Not at all   PHQ-2 Score 0     Abuse: Current or Past(Physical, Sexual or Emotional)- No  Do you feel safe in your environment? Yes    Have you ever done Advance Care Planning? (For example, a Health Directive, POLST, or a discussion with a medical provider or your loved ones about your wishes): No, advance care planning information given to patient to review.  Patient declined advance care planning discussion at this time.    Social History     Tobacco Use     Smoking status: Never Smoker     Smokeless tobacco: Never Used   Substance Use Topics     Alcohol use: Yes         Alcohol Use 3/18/2021   Prescreen: >3 drinks/day or >7 drinks/week? Yes   Prescreen: >3 drinks/day or >7 drinks/week? -   AUDIT SCORE  4       Last PSA: No results found for: PSA    Reviewed orders with patient. Reviewed health maintenance and updated orders accordingly - Yes  Labs reviewed in EPIC  BP Readings from Last 3 Encounters:    03/18/21 132/83   08/24/18 115/75   06/02/17 129/80    Wt Readings from Last 3 Encounters:   03/18/21 87.5 kg (193 lb)   08/24/18 84.4 kg (186 lb)   06/02/17 87.1 kg (192 lb)                  Patient Active Problem List   Diagnosis     HDL lipoprotein deficiency     AR (allergic rhinitis)     High triglycerides     CARDIOVASCULAR SCREENING; LDL GOAL LESS THAN 160     Bilateral hearing loss, unspecified hearing loss type     Past Surgical History:   Procedure Laterality Date     HERNIA REPAIR, INGUINAL RT/LT  1967    right     VASECTOMY       wisdom teeth  1983       Social History     Tobacco Use     Smoking status: Never Smoker     Smokeless tobacco: Never Used   Substance Use Topics     Alcohol use: Yes     Family History   Problem Relation Age of Onset     Cardiovascular Mother      Alzheimer Disease Mother      Cardiovascular Father         heart attack / pacemaker     Cerebrovascular Disease Father      C.A.D. Father         MI at age 64     Cancer Father         central chest     Alzheimer Disease Maternal Grandmother      Cancer - colorectal Maternal Grandfather         at age 85     Alzheimer Disease Paternal Grandfather      Cardiovascular Brother      Alzheimer Disease Paternal Grandmother      Prostate Cancer No family hx of      Anesthesia Reaction No family hx of      Blood Disease No family hx of          Current Outpatient Medications   Medication Sig Dispense Refill     loratadine (CLARITIN) 10 MG tablet Take 10 mg by mouth daily       Multiple Vitamin (MULTI-VITAMIN) per tablet Take 1 tablet by mouth daily.       Omega-3 Fatty Acids (FISH OIL) 1200 MG capsule Take 2 capsules by mouth daily.       Allergies   Allergen Reactions     Nkda [No Known Drug Allergies]        Reviewed and updated as needed this visit by clinical staff  Tobacco  Allergies  Meds  Problems  Med Hx  Surg Hx  Fam Hx  Soc Hx          Reviewed and updated as needed this visit by Provider  Tobacco  Allergies  Meds   "Problems  Med Hx  Surg Hx  Fam Hx           Past Medical History:   Diagnosis Date     AR (allergic rhinitis)     spring & fall      Past Surgical History:   Procedure Laterality Date     HERNIA REPAIR, INGUINAL RT/LT  1967    right     VASECTOMY       wisdom teeth  1983       Review of Systems   Constitutional: Negative for chills and fever.   HENT: Negative for congestion, ear pain, hearing loss and sore throat.    Eyes: Negative for pain and visual disturbance.   Respiratory: Negative for cough and shortness of breath.    Cardiovascular: Negative for chest pain, palpitations and peripheral edema.   Gastrointestinal: Negative for abdominal pain, constipation, diarrhea, heartburn, hematochezia and nausea.   Genitourinary: Negative for discharge, dysuria, frequency, genital sores, hematuria, impotence and urgency.   Musculoskeletal: Negative for arthralgias, joint swelling and myalgias.   Skin: Negative for rash.   Neurological: Negative for dizziness, weakness, headaches and paresthesias.   Psychiatric/Behavioral: Negative for mood changes. The patient is not nervous/anxious.          OBJECTIVE:   /83   Pulse 71   Resp 16   Ht 1.753 m (5' 9\")   Wt 87.5 kg (193 lb)   SpO2 98%   BMI 28.50 kg/m      Physical Exam  GENERAL: healthy, alert and no distress  EYES: Eyes grossly normal to inspection, PERRL and conjunctivae and sclerae normal  HENT: ear canals and TM's normal, nose and mouth without ulcers or lesions  NECK: no adenopathy, no asymmetry, masses, or scars and thyroid normal to palpation  RESP: lungs clear to auscultation - no rales, rhonchi or wheezes  CV: regular rate and rhythm, normal S1 S2, no S3 or S4, no murmur, click or rub, no peripheral edema and peripheral pulses strong  ABDOMEN: soft, nontender, no hepatosplenomegaly, no masses and bowel sounds normal   (male): normal male genitalia without lesions or urethral discharge, no hernia  MS: no gross musculoskeletal defects noted, no " "edema  SKIN: no suspicious lesions or rashes  NEURO: Normal strength and tone, mentation intact and speech normal  PSYCH: mentation appears normal, affect normal/bright    Diagnostic Test Results:  Labs reviewed in Epic    ASSESSMENT/PLAN:       ICD-10-CM    1. Routine general medical examination at a health care facility  Z00.00 Lipid panel reflex to direct LDL Fasting     Basic metabolic panel   2. Bilateral hearing loss, unspecified hearing loss type  H91.93    3. Cancer screening  Z12.9 PSA, screen     COLOGUARD([employer])   4. Need for vaccination  Z23 TDAP VACCINE (Adacel, Boostrix)  [[id number]]       Patient has been advised of split billing requirements and indicates understanding: Yes  COUNSELING:   Reviewed preventive health counseling, as reflected in patient instructions       Regular exercise       Healthy diet/nutrition       Vision screening       Colon cancer screening       Prostate cancer screening    Estimated body mass index is 28.5 kg/m  as calculated from the following:    Height as of this encounter: 1.753 m (5' 9\").    Weight as of this encounter: 87.5 kg (193 lb).     Weight management plan: Discussed healthy diet and exercise guidelines    He reports that he has never smoked. He has never used smokeless tobacco.      Counseling Resources:  ATP IV Guidelines  Pooled Cohorts Equation Calculator  FRAX Risk Assessment  ICSI Preventive Guidelines  Dietary Guidelines for Americans, 2010  USDA's MyPlate  ASA Prophylaxis  Lung CA Screening    SHARITA Martinez [facility]  "

## 2021-03-18 NOTE — PROGRESS NOTES
"  3  SUBJECTIVE:   CC: Boo Medellin is an 55 year old male who presents for preventive health visit.     {Split Bill scripting  The purpose of this visit is to discuss your medical history and prevent health problems before you are sick. You may be responsible for a co-pay, coinsurance, or deductible if your visit today includes services such as checking on a sore throat, having an x-ray or lab test, or treating and evaluating a new or existing condition :855718}  Patient has been advised of split billing requirements and indicates understanding: {Yes and No:028922}  Healthy Habits:    Do you get at least three servings of calcium containing foods daily (dairy, green leafy vegetables, etc.)? { :054744::\"yes\"}    Amount of exercise or daily activities, outside of work: { :601062}    Problems taking medications regularly { :822962::\"No\"}    Medication side effects: { :198523::\"No\"}    Have you had an eye exam in the past two years? { :711705}    Do you see a dentist twice per year? { :830951}    Do you have sleep apnea, excessive snoring or daytime drowsiness?{ :009415}  {Outside tests to abstract? :283148}    {additional problems to add (Optional):442969}    Today's PHQ-2 Score:   PHQ-2 ( 1999 Pfizer) 8/24/2018 12/21/2015   Q1: Little interest or pleasure in doing things 0 0   Q2: Feeling down, depressed or hopeless 0 0   PHQ-2 Score 0 0   Q1: Little interest or pleasure in doing things Not at all -   Q2: Feeling down, depressed or hopeless Not at all -   PHQ-2 Score 0 -     {PHQ-2 LOOK IN ASSESSMENTS (Optional) :890718}  Abuse: Current or Past(Physical, Sexual or Emotional)- {YES/NO/NA:933064}  Do you feel safe in your environment? {YES/NO/NA:788247}    Have you ever done Advance Care Planning? (For example, a Health Directive, POLST, or a discussion with a medical provider or your loved ones about your wishes): { :699935}    Social History     Tobacco Use     Smoking status: Never Smoker     Smokeless tobacco: " "Never Used   Substance Use Topics     Alcohol use: Yes     If you drink alcohol do you typically have >3 drinks per day or >7 drinks per week? {ETOH :239197}                      Last PSA: No results found for: PSA    Reviewed orders with patient. Reviewed health maintenance and updated orders accordingly - {Yes/No:597646::\"Yes\"}  {Chronicprobdata (Optional):644678}    Reviewed and updated as needed this visit by clinical staff                 Reviewed and updated as needed this visit by Provider                {HISTORY OPTIONS (Optional):931666}    ROS:  { :605578::\"CONSTITUTIONAL: NEGATIVE for fever, chills, change in weight\",\"INTEGUMENTARY/SKIN: NEGATIVE for worrisome rashes, moles or lesions\",\"EYES: NEGATIVE for vision changes or irritation\",\"ENT: NEGATIVE for ear, mouth and throat problems\",\"RESP: NEGATIVE for significant cough or SOB\",\"CV: NEGATIVE for chest pain, palpitations or peripheral edema\",\"GI: NEGATIVE for nausea, abdominal pain, heartburn, or change in bowel habits\",\" male: negative for dysuria, hematuria, decreased urinary stream, erectile dysfunction, urethral discharge\",\"MUSCULOSKELETAL: NEGATIVE for significant arthralgias or myalgia\",\"NEURO: NEGATIVE for weakness, dizziness or paresthesias\",\"PSYCHIATRIC: NEGATIVE for changes in mood or affect\"}    OBJECTIVE:   There were no vitals taken for this visit.  EXAM:  {Exam Choices:695526}    {Diagnostic Test Results (Optional):324984::\"Diagnostic Test Results:\",\"Labs reviewed in Epic\"}    ASSESSMENT/PLAN:   {Diag Picklist:310095}    Patient has been advised of split billing requirements and indicates understanding: {YES / NO:313869::\"Yes\"}  COUNSELING:  {MALE COUNSELING MESSAGES:450567::\"Reviewed preventive health counseling, as reflected in patient instructions\"}    Estimated body mass index is 27.47 kg/m  as calculated from the following:    Height as of 8/24/18: 1.753 m (5' 9\").    Weight as of 8/24/18: 84.4 kg (186 lb).    {Weight Management Plan " (ACO) Complete if BMI is abnormal-  Ages 18-64  BMI >24.9.  Age 65+ with BMI <23 or >30 (Optional):230157}    He reports that he has never smoked. He has never used smokeless tobacco.      Counseling Resources:  ATP IV Guidelines  Pooled Cohorts Equation Calculator  FRAX Risk Assessment  ICSI Preventive Guidelines  Dietary Guidelines for Americans, 2010  USDA's MyPlate  ASA Prophylaxis  Lung CA Screening    SHARITA Martinez [facility]

## 2021-03-18 NOTE — PROGRESS NOTES
{PROVIDER CHARTING PREFERENCE:132315}    Subjective   Rey is a 55 year old who presents for the following health issues {ACCOMPANIED BY STATEMENT (Optional):602404}    HPI     {SUPERLIST (Optional):371225}  {additonal problems for provider to add (Optional):451094}    Review of Systems   {ROS COMP (Optional):860445}      Objective    There were no vitals taken for this visit.  There is no height or weight on file to calculate BMI.  Physical Exam   {Exam List (Optional):785169}    {Diagnostic Test Results (Optional):722106}    {AMBULATORY ATTESTATION (Optional):414521}

## 2021-04-03 DIAGNOSIS — Z12.9 CANCER SCREENING: ICD-10-CM

## 2021-04-03 DIAGNOSIS — Z00.00 ROUTINE GENERAL MEDICAL EXAMINATION AT A HEALTH CARE FACILITY: ICD-10-CM

## 2021-04-03 PROCEDURE — 80048 BASIC METABOLIC PNL TOTAL CA: CPT | Performed by: PHYSICIAN ASSISTANT

## 2021-04-03 PROCEDURE — G0103 PSA SCREENING: HCPCS | Performed by: PHYSICIAN ASSISTANT

## 2021-04-03 PROCEDURE — 80061 LIPID PANEL: CPT | Performed by: PHYSICIAN ASSISTANT

## 2021-04-03 PROCEDURE — 36415 COLL VENOUS BLD VENIPUNCTURE: CPT | Performed by: PHYSICIAN ASSISTANT

## 2021-04-05 LAB
ANION GAP SERPL CALCULATED.3IONS-SCNC: 5 MMOL/L (ref 3–14)
BUN SERPL-MCNC: 12 MG/DL (ref 7–30)
CALCIUM SERPL-MCNC: 9.3 MG/DL (ref 8.5–10.1)
CHLORIDE SERPL-SCNC: 108 MMOL/L (ref 94–109)
CHOLEST SERPL-MCNC: 249 MG/DL
CO2 SERPL-SCNC: 27 MMOL/L (ref 20–32)
CREAT SERPL-MCNC: 1.01 MG/DL (ref 0.66–1.25)
GFR SERPL CREATININE-BSD FRML MDRD: 83 ML/MIN/{1.73_M2}
GLUCOSE SERPL-MCNC: 94 MG/DL (ref 70–99)
HDLC SERPL-MCNC: 53 MG/DL
LDLC SERPL CALC-MCNC: 160 MG/DL
NONHDLC SERPL-MCNC: 196 MG/DL
POTASSIUM SERPL-SCNC: 4.2 MMOL/L (ref 3.4–5.3)
PSA SERPL-ACNC: 0.4 UG/L (ref 0–4)
SODIUM SERPL-SCNC: 140 MMOL/L (ref 133–144)
TRIGL SERPL-MCNC: 180 MG/DL

## 2021-04-10 LAB — COLOGUARD-ABSTRACT: NEGATIVE

## 2021-09-26 ENCOUNTER — HEALTH MAINTENANCE LETTER (OUTPATIENT)
Age: 56
End: 2021-09-26

## 2021-11-01 ENCOUNTER — TELEPHONE (OUTPATIENT)
Dept: FAMILY MEDICINE | Facility: CLINIC | Age: 56
End: 2021-11-01

## 2021-11-01 NOTE — TELEPHONE ENCOUNTER
Patient dropped forms off for the doctor. Please call once completed.    Alert and oriented to person, place and time

## 2021-11-02 NOTE — TELEPHONE ENCOUNTER
[facility] form received. (Physical was 3/18/2021) TC filled out. Please sign and date and route back to the team to fax.  Thank you,  Lashawn Rockwell

## 2021-11-02 NOTE — TELEPHONE ENCOUNTER
Completed [employer] form faxed to [employer] # [phone]. Original mailed to his home address.  Lashawn Rockwell

## 2022-05-08 ENCOUNTER — HEALTH MAINTENANCE LETTER (OUTPATIENT)
Age: 57
End: 2022-05-08

## 2023-01-08 ENCOUNTER — HEALTH MAINTENANCE LETTER (OUTPATIENT)
Age: 58
End: 2023-01-08

## 2023-06-02 ENCOUNTER — HEALTH MAINTENANCE LETTER (OUTPATIENT)
Age: 58
End: 2023-06-02

## 2023-09-06 ENCOUNTER — ANCILLARY PROCEDURE (OUTPATIENT)
Dept: GENERAL RADIOLOGY | Facility: CLINIC | Age: 58
End: 2023-09-06
Attending: PEDIATRICS
Payer: COMMERCIAL

## 2023-09-06 ENCOUNTER — OFFICE VISIT (OUTPATIENT)
Dept: ORTHOPEDICS | Facility: CLINIC | Age: 58
End: 2023-09-06
Payer: COMMERCIAL

## 2023-09-06 VITALS — HEIGHT: 71 IN | BODY MASS INDEX: 27.02 KG/M2 | WEIGHT: 193 LBS

## 2023-09-06 DIAGNOSIS — S49.91XA INJURY OF RIGHT SHOULDER, INITIAL ENCOUNTER: Primary | ICD-10-CM

## 2023-09-06 DIAGNOSIS — M25.511 CHRONIC RIGHT SHOULDER PAIN: ICD-10-CM

## 2023-09-06 DIAGNOSIS — G89.29 CHRONIC RIGHT SHOULDER PAIN: ICD-10-CM

## 2023-09-06 PROCEDURE — 73030 X-RAY EXAM OF SHOULDER: CPT | Mod: TC | Performed by: RADIOLOGY

## 2023-09-06 PROCEDURE — 99204 OFFICE O/P NEW MOD 45 MIN: CPT | Performed by: PEDIATRICS

## 2023-09-06 NOTE — LETTER
9/6/2023         RE: Boo Medellin  [address] [address] [id number]        Dear Colleague,    Thank you for referring your patient, Boo Medellin, to the [facility]. Please see a copy of my visit note below.    ASSESSMENT & PLAN    Boo was seen today for injury.    Diagnoses and all orders for this visit:    Injury of right shoulder, initial encounter  -     Physical Therapy Referral; Future    Chronic right shoulder pain  -     XR Shoulder Right G/E 3 Views; Future  -     Physical Therapy Referral; Future      Start with PT, and if ongoing issues likely consider MRI.  Questions answered. Discussed signs and symptoms that may indicate more serious issues; the patient was instructed to seek appropriate care if noted. Rey indicates understanding of these issues and agrees with the plan.      See Patient Instructions  Patient Instructions   Reviewed nature of the right shoulder injury and ongoing symptoms. This is consistent with rotator cuff source, at least a strain.  Discussed considerations around additional imaging with MRI, referral to physical therapy, and possible subacromial steroid injection.  Following discussion, plan physical therapy next, referral placed.  Monitor course 4-6 weeks with PT to begin. If improving well, then follow-up is open ended.  Otherwise, if any lingering issues with the shoulder, or any other questions or concerns in the meantime, contact clinic, and would likely obtain MRI of the right shoulder.    If you have any further questions for your physician or physician s care team you can contact them thru MyChart or by calling [phone].      Esteban Peace,   [facility]    -----  Chief Complaint   Patient presents with     Right Shoulder - Injury       SUBJECTIVE  Boo Medellin is a/an 58 year old male who is seen as a self referral for evaluation of Right shoulder.     The  "patient is seen by themselves.  The patient is Right handed    Onset: 10+  Month (s) ago. Patient describes injury as working on [place] garage door [place] the cabin and was trying to re-roll the garage spring, and felt a pain in the shoulder when he was bringing the roll down. Pain is improving but motion is limited.  Location of Pain: right Shoulder, anterior shoulder  Worsened by: worse with overhand/overhead motions (throwing a ball), sleeping on the side (originally)  Better with:  Treatments tried: rest/activity avoidance  Associated symptoms: no distal numbness or tingling; denies swelling or warmth    Orthopedic/Surgical history: yes--history shoulder separation with hockey; no prior right shoulder surgery  Social History/Occupation: Finance    **  Above information per rooming staff.  Additional history:  Noted a pulling sensation as was letting garage door down, shifting from overhead press motion, to more of a curl psotion. Felt a strain, pulling sensation.  Overall improved with time.  Then tried to throw about a month ago, and noted pain and difficult to throw again. No issue with underhand motion.  Difficult with overhead motion nicolle with throwing motion.          REVIEW OF SYSTEMS:  Review of Systems    OBJECTIVE:  Ht 1.803 m (5' 11\")   Wt 87.5 kg (193 lb)   BMI 26.92 kg/m     General: healthy, alert and in no distress  Skin: no suspicious lesions or rash.  CV: distal perfusion intact   Resp: normal respiratory effort without conversational dyspnea   Psych: normal mood and affect  Gait: NORMAL  Neuro: Normal light sensory exam of extremity         Right Shoulder exam    ROM:      forward flexion grossly symmetric, no change        abduction ~110, pain       internal rotation lacking 3-4 vertebral segments, pain, tight; thumb to lumbar       external rotation grossly intact, no change    Strength:      abduction 5/5       internal rotation 5/5       external rotation 4+/5       Grossly symmetric, no " change    Impingement testing:      positive (+) [place]       positive (+) empty can       neg (-) crossover       Mild pain with O'sandeep    Skin:      no visible deformities       well perfused       capillary refill brisk    Sensation:      normal sensation over shoulder and upper extremity       RADIOLOGY:  Final results and radiologist's interpretation, available in the [facility] health record.  Images were reviewed with the patient in the office today.  My personal interpretation of the performed imaging: irregularity of the distal clavicle consistent with his history of previous AC joint separation remotely. No clear acute bony abnormality noted.          Recent Results (from the past 24 hour(s))   XR Shoulder Right G/E 3 Views    Narrative    XR SHOULDER RIGHT G/E 3 VIEWS 9/6/2023 8:52 AM    HISTORY: Chronic right shoulder pain; Chronic right shoulder pain    COMPARISON: None.      Impression    IMPRESSION: No fracture or dislocation. Status post distal clavicular  excision. No degenerative changes.    RADHA CARLIN MD         SYSTEM ID:  [id number]                        Again, thank you for allowing me to participate in the care of your patient.        Sincerely,        Esteban Peace DO

## 2023-09-06 NOTE — PROGRESS NOTES
ASSESSMENT & PLAN    Boo was seen today for injury.    Diagnoses and all orders for this visit:    Injury of right shoulder, initial encounter  -     Physical Therapy Referral; Future    Chronic right shoulder pain  -     XR Shoulder Right G/E 3 Views; Future  -     Physical Therapy Referral; Future      Start with PT, and if ongoing issues likely consider MRI.  Questions answered. Discussed signs and symptoms that may indicate more serious issues; the patient was instructed to seek appropriate care if noted. Rey indicates understanding of these issues and agrees with the plan.      See Patient Instructions  Patient Instructions   Reviewed nature of the right shoulder injury and ongoing symptoms. This is consistent with rotator cuff source, at least a strain.  Discussed considerations around additional imaging with MRI, referral to physical therapy, and possible subacromial steroid injection.  Following discussion, plan physical therapy next, referral placed.  Monitor course 4-6 weeks with PT to begin. If improving well, then follow-up is open ended.  Otherwise, if any lingering issues with the shoulder, or any other questions or concerns in the meantime, contact clinic, and would likely obtain MRI of the right shoulder.    If you have any further questions for your physician or physician s care team you can contact them thru MyChart or by calling [phone].      [facility]    -----  Chief Complaint   Patient presents with    Right Shoulder - Injury       SUBJECTIVE  Boo Medellin is a/an 58 year old male who is seen as a self referral for evaluation of Right shoulder.     The patient is seen by themselves.  The patient is Right handed    Onset: 10+  Month (s) ago. Patient describes injury as working on [place] door [place] the [place] and was trying to re-roll the garage spring, and felt a pain in the shoulder when he was bringing the roll down.  "Pain is improving but motion is limited.  Location of Pain: right Shoulder, anterior shoulder  Worsened by: worse with overhand/overhead motions (throwing a ball), sleeping on the side (originally)  Better with:  Treatments tried: rest/activity avoidance  Associated symptoms: no distal numbness or tingling; denies swelling or warmth    Orthopedic/Surgical history: yes--history shoulder separation with hockey; no prior right shoulder surgery  Social History/Occupation: Finance    **  Above information per rooming staff.  Additional history:  Noted a pulling sensation as was letting garage door down, shifting from overhead press motion, to more of a curl psotion. Felt a strain, pulling sensation.  Overall improved with time.  Then tried to throw about a month ago, and noted pain and difficult to throw again. No issue with underhand motion.  Difficult with overhead motion nicolle with throwing motion.          REVIEW OF SYSTEMS:  Review of Systems    OBJECTIVE:  Ht 1.803 m (5' 11\")   Wt 87.5 kg (193 lb)   BMI 26.92 kg/m     General: healthy, alert and in no distress  Skin: no suspicious lesions or rash.  CV: distal perfusion intact   Resp: normal respiratory effort without conversational dyspnea   Psych: normal mood and affect  Gait: NORMAL  Neuro: Normal light sensory exam of extremity         Right Shoulder exam    ROM:      forward flexion grossly symmetric, no change        abduction ~110, pain       internal rotation lacking 3-4 vertebral segments, pain, tight; thumb to lumbar       external rotation grossly intact, no change    Strength:      abduction 5/5       internal rotation 5/5       external rotation 4+/5       Grossly symmetric, no change    Impingement testing:      positive (+) [place]       positive (+) empty can       neg (-) crossover       Mild pain with O'sandeep    Skin:      no visible deformities       well perfused       capillary refill brisk    Sensation:      normal sensation over shoulder and " upper extremity       RADIOLOGY:  Final results and radiologist's interpretation, available in the [facility] health record.  Images were reviewed with the patient in the office today.  My personal interpretation of the performed imaging: irregularity of the distal clavicle consistent with his history of previous AC joint separation remotely. No clear acute bony abnormality noted.          Recent Results (from the past 24 hour(s))   XR Shoulder Right G/E 3 Views    Narrative    XR SHOULDER RIGHT G/E 3 VIEWS 9/6/2023 8:52 AM    HISTORY: Chronic right shoulder pain; Chronic right shoulder pain    COMPARISON: None.      Impression    IMPRESSION: No fracture or dislocation. Status post distal clavicular  excision. No degenerative changes.    RADHA CARLIN MD         SYSTEM ID:  [id number]

## 2023-09-06 NOTE — PATIENT INSTRUCTIONS
Reviewed nature of the right shoulder injury and ongoing symptoms. This is consistent with rotator cuff source, at least a strain.  Discussed considerations around additional imaging with MRI, referral to physical therapy, and possible subacromial steroid injection.  Following discussion, plan physical therapy next, referral placed.  Monitor course 4-6 weeks with PT to begin. If improving well, then follow-up is open ended.  Otherwise, if any lingering issues with the shoulder, or any other questions or concerns in the meantime, contact clinic, and would likely obtain MRI of the right shoulder.    If you have any further questions for your physician or physician s care team you can contact them thru [email or web address]t or by calling [phone].

## 2023-09-08 NOTE — PROGRESS NOTES
SUBJECTIVE:   CC: Rey is an 58 year old who presents for preventative health visit.       9/15/2023     7:07 AM   Additional Questions   Roomed by Simon Montes MA   Accompanied by Self     Routine general medical examination at a health care facility  Updated  - Lipid panel reflex to direct LDL Fasting; Future  - Basic metabolic panel  (Ca, Cl, CO2, Creat, Gluc, K, Na, BUN); Future  Peaks/valleys workout with workout  Cutting back on calories, not hungry sometimes. Low carbs. No CAD. No gi concerns.  Recheck high cholesterol today, discussed    Screening PSA (prostate specific antigen)  Ordered  - PSA, screen; Future  - PSA, screen    Chronic right shoulder pain  Ongoing pain  With PT/sports med now in [place] to prevent future surgery              Healthy Habits:     Getting at least 3 servings of Calcium per day:  NO    Bi-annual eye exam:  Yes    Dental care twice a year:  Yes    Sleep apnea or symptoms of sleep apnea:  None    Diet:  Regular (no restrictions)    Frequency of exercise:  2-3 days/week    Duration of exercise:  15-30 minutes    Taking medications regularly:  Yes    Medication side effects:  None    Additional concerns today:  No                      Social History     Tobacco Use    Smoking status: Never    Smokeless tobacco: Former     Quit date: 1/1/2000   Substance Use Topics    Alcohol use: Yes               9/14/2023     8:22 AM   Alcohol Use   Prescreen: >3 drinks/day or >7 drinks/week? Yes   AUDIT SCORE  4             9/14/2023     8:22 AM   AUDIT - Alcohol Use Disorders Identification Test - Reproduced from the World Health Organization Audit 2001 (Second Edition)   1.  How often do you have a drink containing alcohol? 2 to 3 times a week   2.  How many drinks containing alcohol do you have on a typical day when you are drinking? 1 or 2   3.  How often do you have five or more drinks on one occasion? Less than monthly   4.  How often during the last year have you found that you were not able to  stop drinking once you had started? Never   5.  How often during the last year have you failed to do what was normally expected of you because of drinking? Never   6.  How often during the last year have you needed a first drink in the morning to get yourself going after a heavy drinking session? Never   7.  How often during the last year have you had a feeling of guilt or remorse after drinking? Never   8.  How often during the last year have you been unable to remember what happened the night before because of your drinking? Never   9.  Have you or someone else been injured because of your drinking? No   10. Has a relative, friend, doctor or other health care worker been concerned about your drinking or suggested you cut down? No   TOTAL SCORE 4       Last PSA:   PSA   Date Value Ref Range Status   04/03/2021 0.40 0 - 4 ug/L Final     Comment:     Assay Method:  Chemiluminescence using Siemens Vista analyzer       Reviewed orders with patient. Reviewed health maintenance and updated orders accordingly - Yes  Lab work is in process    Reviewed and updated as needed this visit by clinical staff   Tobacco  Allergies  Meds              Reviewed and updated as needed this visit by Provider                 Past Medical History:   Diagnosis Date    AR (allergic rhinitis)     spring & fall      Past Surgical History:   Procedure Laterality Date    HERNIA REPAIR, INGUINAL RT/LT  1967    right    VASECTOMY      wisdom teeth  1983       Review of Systems   Constitutional:  Negative for chills and fever.   HENT:  Positive for hearing loss. Negative for congestion, ear pain and sore throat.    Eyes:  Negative for pain and visual disturbance.   Respiratory:  Negative for cough and shortness of breath.    Cardiovascular:  Negative for chest pain, palpitations and peripheral edema.   Gastrointestinal:  Negative for abdominal pain, constipation, diarrhea, heartburn, hematochezia and nausea.   Genitourinary:  Positive for  "impotence. Negative for dysuria, frequency, genital sores, hematuria, penile discharge and urgency.   Musculoskeletal:  Negative for arthralgias, joint swelling and myalgias.   Skin:  Negative for rash.   Neurological:  Negative for dizziness, weakness, headaches and paresthesias.   Psychiatric/Behavioral:  Negative for mood changes. The patient is not nervous/anxious.          OBJECTIVE:   /74   Pulse 61   Temp 97.3  F (36.3  C) (Tympanic)   Resp 14   Wt 88 kg (194 lb)   SpO2 99%   BMI 27.06 kg/m        Physical Exam  GENERAL: healthy, alert and no distress  EYES: Eyes grossly normal to inspection, PERRL and conjunctivae and sclerae normal  HENT: ear canals and TM's normal, nose and mouth without ulcers or lesions  NECK: no adenopathy, no asymmetry, masses, or scars and thyroid normal to palpation  RESP: lungs clear to auscultation - no rales, rhonchi or wheezes  CV: regular rate and rhythm, normal S1 S2, no S3 or S4, no murmur, click or rub, no peripheral edema and peripheral pulses strong  ABDOMEN: soft, nontender, no hepatosplenomegaly, no masses and bowel sounds normal  MS: no gross musculoskeletal defects noted, no edema  SKIN: no suspicious lesions or rashes    Diagnostic Test Results:  Labs reviewed in Epic      Patient has been advised of split billing requirements and indicates understanding: Yes      COUNSELING:   Reviewed preventive health counseling, as reflected in patient instructions       Regular exercise       Healthy diet/nutrition       Prostate cancer screening      BMI:   Estimated body mass index is 27.06 kg/m  as calculated from the following:    Height as of 9/6/23: 1.803 m (5' 11\").    Weight as of this encounter: 88 kg (194 lb).   Weight management plan: Discussed healthy diet and exercise guidelines      He reports that he has never smoked. He quit smokeless tobacco use about 23 years ago.            ARELY PARDO PA-C  [facility]  "

## 2023-09-14 ENCOUNTER — THERAPY VISIT (OUTPATIENT)
Dept: PHYSICAL THERAPY | Facility: CLINIC | Age: 58
End: 2023-09-14
Attending: PEDIATRICS
Payer: COMMERCIAL

## 2023-09-14 DIAGNOSIS — G89.29 CHRONIC RIGHT SHOULDER PAIN: ICD-10-CM

## 2023-09-14 DIAGNOSIS — M25.511 CHRONIC RIGHT SHOULDER PAIN: ICD-10-CM

## 2023-09-14 DIAGNOSIS — S49.91XA INJURY OF RIGHT SHOULDER, INITIAL ENCOUNTER: ICD-10-CM

## 2023-09-14 PROCEDURE — 97161 PT EVAL LOW COMPLEX 20 MIN: CPT | Mod: GP | Performed by: PHYSICAL THERAPIST

## 2023-09-14 PROCEDURE — 97110 THERAPEUTIC EXERCISES: CPT | Mod: GP | Performed by: PHYSICAL THERAPIST

## 2023-09-14 ASSESSMENT — ENCOUNTER SYMPTOMS
ABDOMINAL PAIN: 0
SORE THROAT: 0
NERVOUS/ANXIOUS: 0
HEMATOCHEZIA: 0
FEVER: 0
ARTHRALGIAS: 0
FREQUENCY: 0
CHILLS: 0
MYALGIAS: 0
DIZZINESS: 0
SHORTNESS OF BREATH: 0
PARESTHESIAS: 0
DYSURIA: 0
PALPITATIONS: 0
HEMATURIA: 0
WEAKNESS: 0
DIARRHEA: 0
JOINT SWELLING: 0
CONSTIPATION: 0
EYE PAIN: 0
HEADACHES: 0
HEARTBURN: 0
COUGH: 0
NAUSEA: 0

## 2023-09-14 NOTE — PROGRESS NOTES
"PHYSICAL THERAPY EVALUATION  Type of Visit: Evaluation    See electronic medical record for Abuse and Falls Screening details.    Subjective       Presenting condition or subjective complaint: Possible tear in rotator cuff  Date of onset: 09/06/23 (referred to PT)    Relevant medical history:     Dates & types of surgery:      Pt was lowering a garage door from shoulder to elbow height (nine to twelve months ago) and \"that's when it pulled.\"  Pt noted that the pain seemed to get better but mobility didn't.  Isn't painful \"80% of the time\" and does well as long as stays below should height.  Tough raising the arm, throwing a ball, reaching behind the body, quick and sudden movements.  Posterolateral shoulder R pain that is quite sharp when is present.  H/o AC separation late 1990s without residual difficulty; did not have surgery.  Pt is R dominant.    Prior diagnostic imaging/testing results: X-ray     Prior therapy history for the same diagnosis, illness or injury: No      Prior Level of Function  Transfers: Independent  Ambulation: Independent  ADL: Independent  IADL: motor sports    Living Environment  Social support: With a significant other or spouse   Type of home: House; Multi-level; Basement   Stairs to enter the home: Yes 2 Is there a railing: No   Ramp: No   Stairs inside the home: Yes 13 Is there a railing: Yes   Help at home: None  Equipment owned: Walker     Employment: Yes finance and administration  Hobbies/Interests: motor sports i.e. motorcycle, snowmobile, boating; bike riding, running, exercise    Patient goals for therapy: Full range of motion without pain       Objective   SHOULDER EVALUATION  POSTURE: Increased R AC separation R compared to L  GAIT: Pt ambulates without device without gross deviation  ROM: AROM L shoulder flexion 152, extension 50, abduction 155, IR T7, ER 81.  AROM R shoulder flexion 130 positive, extension 56 negative, abduction 94 positive, IR T10 \"tight,\" ER 75 positive.  " PROM R shoulder flexion 145 capsular, abduction 113 pain and capsular, IR T9 capsular, ER 78 pain and capsular.  STRENGTH: MMT 5-/5 R shoulder flexion and ER without symptoms, 5-/5 R abduction with symptoms, 5/5 IR without symptoms.  5/5 L shoulder MMT all directions.  SPECIAL TESTS: Negative ERLS in 30/30/30.  Positive internal impingement R.  Reduced discomfort on active shoulder flexion with assisted scapular posterior tipping compared to without on R.  PALPATION: No tenderness to palpation.  JOINT MOBILITY: GH hypomobility R anterior, posterior, inferior compared to L.  CERVICAL SCREEN: Negative AROM all directions.  Negative Spurling.      Assessment & Plan   CLINICAL IMPRESSIONS  Medical Diagnosis: Chronic right shoulder pain  Injury of right shoulder, initial encounter    Treatment Diagnosis: R shoulder pain   Impression/Assessment: Patient is a 58 year old male with R shoulder complaints.  The following significant findings have been identified: Pain, Decreased ROM/flexibility, Decreased joint mobility, Decreased strength, Impaired muscle performance, Decreased activity tolerance, and Impaired posture. These impairments interfere with their ability to perform self care tasks as compared to previous level of function.     Clinical Decision Making (Complexity):  Clinical Presentation: Stable/Uncomplicated  Clinical Presentation Rationale: based on medical and personal factors listed in PT evaluation  Clinical Decision Making (Complexity): Low complexity    PLAN OF CARE  Treatment Interventions:  Interventions: Manual Therapy, Neuromuscular Re-education, Therapeutic Activity, Therapeutic Exercise    Long Term Goals            Frequency of Treatment: once per week  Duration of Treatment: six weeks    Recommended Referrals to Other Professionals:   Education Assessment:   Learner/Method: Patient    Risks and benefits of evaluation/treatment have been explained.   Patient/Family/caregiver agrees with Plan of Care.      Evaluation Time:     PT Layne Low Complexity Minutes (39326): 25     Signing Clinician: Po Florentino PT

## 2023-09-15 ENCOUNTER — OFFICE VISIT (OUTPATIENT)
Dept: FAMILY MEDICINE | Facility: CLINIC | Age: 58
End: 2023-09-15
Payer: COMMERCIAL

## 2023-09-15 VITALS
OXYGEN SATURATION: 99 % | RESPIRATION RATE: 14 BRPM | SYSTOLIC BLOOD PRESSURE: 113 MMHG | WEIGHT: 194 LBS | DIASTOLIC BLOOD PRESSURE: 74 MMHG | TEMPERATURE: 97.3 F | BODY MASS INDEX: 27.06 KG/M2 | HEART RATE: 61 BPM

## 2023-09-15 DIAGNOSIS — G89.29 CHRONIC RIGHT SHOULDER PAIN: ICD-10-CM

## 2023-09-15 DIAGNOSIS — Z00.00 ROUTINE GENERAL MEDICAL EXAMINATION AT A HEALTH CARE FACILITY: Primary | ICD-10-CM

## 2023-09-15 DIAGNOSIS — Z12.5 SCREENING PSA (PROSTATE SPECIFIC ANTIGEN): ICD-10-CM

## 2023-09-15 DIAGNOSIS — M25.511 CHRONIC RIGHT SHOULDER PAIN: ICD-10-CM

## 2023-09-15 LAB
ANION GAP SERPL CALCULATED.3IONS-SCNC: 12 MMOL/L (ref 7–15)
BUN SERPL-MCNC: 15.1 MG/DL (ref 6–20)
CALCIUM SERPL-MCNC: 9.3 MG/DL (ref 8.6–10)
CHLORIDE SERPL-SCNC: 106 MMOL/L (ref 98–107)
CHOLEST SERPL-MCNC: 241 MG/DL
CREAT SERPL-MCNC: 1.05 MG/DL (ref 0.67–1.17)
DEPRECATED HCO3 PLAS-SCNC: 24 MMOL/L (ref 22–29)
EGFRCR SERPLBLD CKD-EPI 2021: 82 ML/MIN/1.73M2
GLUCOSE SERPL-MCNC: 96 MG/DL (ref 70–99)
HDLC SERPL-MCNC: 51 MG/DL
LDLC SERPL CALC-MCNC: 163 MG/DL
NONHDLC SERPL-MCNC: 190 MG/DL
POTASSIUM SERPL-SCNC: 4.4 MMOL/L (ref 3.4–5.3)
PSA SERPL DL<=0.01 NG/ML-MCNC: 0.38 NG/ML (ref 0–3.5)
SODIUM SERPL-SCNC: 142 MMOL/L (ref 136–145)
TRIGL SERPL-MCNC: 133 MG/DL

## 2023-09-15 PROCEDURE — 36415 COLL VENOUS BLD VENIPUNCTURE: CPT | Performed by: PHYSICIAN ASSISTANT

## 2023-09-15 PROCEDURE — 80061 LIPID PANEL: CPT | Performed by: PHYSICIAN ASSISTANT

## 2023-09-15 PROCEDURE — G0103 PSA SCREENING: HCPCS | Performed by: PHYSICIAN ASSISTANT

## 2023-09-15 PROCEDURE — 99396 PREV VISIT EST AGE 40-64: CPT | Performed by: PHYSICIAN ASSISTANT

## 2023-09-15 PROCEDURE — 80048 BASIC METABOLIC PNL TOTAL CA: CPT | Performed by: PHYSICIAN ASSISTANT

## 2023-09-15 ASSESSMENT — ENCOUNTER SYMPTOMS
FEVER: 0
HEARTBURN: 0
JOINT SWELLING: 0
CHILLS: 0
FREQUENCY: 0
HEADACHES: 0
DYSURIA: 0
WEAKNESS: 0
DIARRHEA: 0
HEMATOCHEZIA: 0
ARTHRALGIAS: 0
DIZZINESS: 0
CONSTIPATION: 0
COUGH: 0
PALPITATIONS: 0
NERVOUS/ANXIOUS: 0
HEMATURIA: 0
ABDOMINAL PAIN: 0
NAUSEA: 0
EYE PAIN: 0
PARESTHESIAS: 0
SORE THROAT: 0
MYALGIAS: 0
SHORTNESS OF BREATH: 0

## 2023-09-15 ASSESSMENT — PAIN SCALES - GENERAL: PAINLEVEL: NO PAIN (0)

## 2023-09-21 ENCOUNTER — THERAPY VISIT (OUTPATIENT)
Dept: PHYSICAL THERAPY | Facility: CLINIC | Age: 58
End: 2023-09-21
Attending: PEDIATRICS
Payer: COMMERCIAL

## 2023-09-21 DIAGNOSIS — M25.511 CHRONIC RIGHT SHOULDER PAIN: Primary | ICD-10-CM

## 2023-09-21 DIAGNOSIS — G89.29 CHRONIC RIGHT SHOULDER PAIN: Primary | ICD-10-CM

## 2023-09-21 PROCEDURE — 97110 THERAPEUTIC EXERCISES: CPT | Mod: GP | Performed by: PHYSICAL THERAPIST

## 2023-09-21 PROCEDURE — 97140 MANUAL THERAPY 1/> REGIONS: CPT | Mod: GP | Performed by: PHYSICAL THERAPIST

## 2023-09-28 ENCOUNTER — THERAPY VISIT (OUTPATIENT)
Dept: PHYSICAL THERAPY | Facility: CLINIC | Age: 58
End: 2023-09-28
Attending: PEDIATRICS
Payer: COMMERCIAL

## 2023-09-28 DIAGNOSIS — G89.29 CHRONIC RIGHT SHOULDER PAIN: Primary | ICD-10-CM

## 2023-09-28 DIAGNOSIS — M25.511 CHRONIC RIGHT SHOULDER PAIN: Primary | ICD-10-CM

## 2023-09-28 PROCEDURE — 97110 THERAPEUTIC EXERCISES: CPT | Mod: GP | Performed by: PHYSICAL THERAPIST

## 2023-09-28 PROCEDURE — 97530 THERAPEUTIC ACTIVITIES: CPT | Mod: GP | Performed by: PHYSICAL THERAPIST

## 2023-11-28 NOTE — PROGRESS NOTES
Pt last seen in PT 09/28.  He has since cancelled PT with note that further rehab not needed.  No appts are currently scheduled.  Consider note dated 09/28 to serve as final summary.

## 2024-05-03 DIAGNOSIS — Z12.11 COLON CANCER SCREENING: ICD-10-CM

## 2024-05-17 ENCOUNTER — ORDERS ONLY (AUTO-RELEASED) (OUTPATIENT)
Dept: FAMILY MEDICINE | Facility: CLINIC | Age: 59
End: 2024-05-17
Payer: COMMERCIAL

## 2024-05-17 DIAGNOSIS — Z12.11 COLON CANCER SCREENING: ICD-10-CM

## 2024-11-16 ENCOUNTER — HEALTH MAINTENANCE LETTER (OUTPATIENT)
Age: 59
End: 2024-11-16

## 2024-12-12 ENCOUNTER — OFFICE VISIT (OUTPATIENT)
Dept: URGENT CARE | Facility: URGENT CARE | Age: 59
End: 2024-12-12
Payer: COMMERCIAL

## 2024-12-12 VITALS
TEMPERATURE: 97.2 F | HEART RATE: 66 BPM | DIASTOLIC BLOOD PRESSURE: 89 MMHG | BODY MASS INDEX: 27.75 KG/M2 | SYSTOLIC BLOOD PRESSURE: 134 MMHG | OXYGEN SATURATION: 98 % | WEIGHT: 199 LBS | RESPIRATION RATE: 16 BRPM

## 2024-12-12 DIAGNOSIS — T16.1XXA FOREIGN BODY OF RIGHT EAR, INITIAL ENCOUNTER: Primary | ICD-10-CM

## 2024-12-12 NOTE — PROGRESS NOTES
Assessment & Plan     Foreign body of right ear, initial encounter       Alligator forceps used to remove hearing aid piece from right ear canal. Patient tolerated well. TM and right ear canal normal. Denies any pain.     Follow-up with PCP if symptoms develop.     All questions were answered and patient verbalized understanding. AVS sent via [employer]alexsandra Norman, DNP, APRN, CNP 12/12/2024 5:04 PM  [facility] [place]    Subjective     Rey is a 59 year old male who presents to clinic today for the following health issues:  Chief Complaint   Patient presents with    Urgent Care     Present for removal of hearing aid piece that is stuck in right ear.      Patient presents for evaluation of foreign body in right ear. 12 hours ago part of his hearing aid broke off in his ear. Denies pain, drainage, fever. Went to [facility] and was told it was there but they could not try and remove.     Problem list, Medication list, Allergies, and Medical history reviewed in EPIC.    ROS:  Review of systems negative except for noted above        Objective    /89   Pulse 66   Temp 97.2  F (36.2  C) (Tympanic)   Resp 16   Wt 90.3 kg (199 lb)   SpO2 98%   BMI 27.75 kg/m    Physical Exam  Constitutional:       General: He is not in acute distress.     Appearance: He is not toxic-appearing or diaphoretic.   HENT:      Head: Normocephalic and atraumatic.      Right Ear: External ear normal.      Left Ear: Tympanic membrane, ear canal and external ear normal.      Ears:      Comments: Red and black foreign body right ear canal occluding ear canal, unable to visualize TM  Neurological:      Mental Status: He is alert.

## 2025-01-22 ENCOUNTER — TELEPHONE (OUTPATIENT)
Dept: FAMILY MEDICINE | Facility: CLINIC | Age: 60
End: 2025-01-22
Payer: COMMERCIAL

## 2025-01-22 NOTE — TELEPHONE ENCOUNTER
Order/Referral Request    Who is requesting: Patient    Orders being requested: Previsit lab orders.  Patient scheduled for Preventative Visit with provider on 1/30/25.    Does patient have an appointment scheduled?: Yes: 1/30/25 with Dr. Boo Coffman    Where to send orders: Place orders within Epic    Could we send this information to you in [facility] or would you prefer to receive a phone call?:   Patient would prefer a phone call   Okay to leave a detailed message?: Yes at Cell number on file:    Telephone Information:   Mobile [phone]

## 2025-01-23 NOTE — TELEPHONE ENCOUNTER
Rey,  Please fast prior to your appointment on 1/30/25. We will determine what labs need to be ordered at that time  Boo Coffman MD on 1/23/2025 at 1:00 PM

## 2025-01-25 SDOH — HEALTH STABILITY: PHYSICAL HEALTH: ON AVERAGE, HOW MANY DAYS PER WEEK DO YOU ENGAGE IN MODERATE TO STRENUOUS EXERCISE (LIKE A BRISK WALK)?: 4 DAYS

## 2025-01-25 SDOH — HEALTH STABILITY: PHYSICAL HEALTH: ON AVERAGE, HOW MANY MINUTES DO YOU ENGAGE IN EXERCISE AT THIS LEVEL?: 50 MIN

## 2025-01-25 ASSESSMENT — SOCIAL DETERMINANTS OF HEALTH (SDOH): HOW OFTEN DO YOU GET TOGETHER WITH FRIENDS OR RELATIVES?: TWICE A WEEK

## 2025-01-27 NOTE — TELEPHONE ENCOUNTER
Notified patient to come to appointment on 1/30/25 fasting for at least 10 hours prior to office visit.  No questions or concerns from patient.  Nanette MONROY    [facility]

## 2025-01-30 ENCOUNTER — OFFICE VISIT (OUTPATIENT)
Dept: FAMILY MEDICINE | Facility: CLINIC | Age: 60
End: 2025-01-30
Payer: COMMERCIAL

## 2025-01-30 VITALS
TEMPERATURE: 96.8 F | SYSTOLIC BLOOD PRESSURE: 115 MMHG | HEIGHT: 71 IN | DIASTOLIC BLOOD PRESSURE: 78 MMHG | OXYGEN SATURATION: 96 % | HEART RATE: 60 BPM | BODY MASS INDEX: 27.58 KG/M2 | WEIGHT: 197 LBS | RESPIRATION RATE: 16 BRPM

## 2025-01-30 DIAGNOSIS — Z13.1 SCREENING FOR DIABETES MELLITUS: ICD-10-CM

## 2025-01-30 DIAGNOSIS — Z82.49 FAMILY HISTORY OF CORONARY ARTERIOSCLEROSIS: ICD-10-CM

## 2025-01-30 DIAGNOSIS — Z12.5 SCREENING FOR PROSTATE CANCER: ICD-10-CM

## 2025-01-30 DIAGNOSIS — Z12.11 SPECIAL SCREENING FOR MALIGNANT NEOPLASMS, COLON: ICD-10-CM

## 2025-01-30 DIAGNOSIS — Z13.220 SCREENING FOR LIPOID DISORDERS: ICD-10-CM

## 2025-01-30 DIAGNOSIS — Z00.00 ROUTINE GENERAL MEDICAL EXAMINATION AT A HEALTH CARE FACILITY: Primary | ICD-10-CM

## 2025-01-30 DIAGNOSIS — J30.2 SEASONAL ALLERGIC RHINITIS, UNSPECIFIED TRIGGER: ICD-10-CM

## 2025-01-30 LAB
CHOLEST SERPL-MCNC: 237 MG/DL
FASTING STATUS PATIENT QL REPORTED: YES
FASTING STATUS PATIENT QL REPORTED: YES
GLUCOSE SERPL-MCNC: 94 MG/DL (ref 70–99)
HDLC SERPL-MCNC: 49 MG/DL
LDLC SERPL CALC-MCNC: 156 MG/DL
NONHDLC SERPL-MCNC: 188 MG/DL
PSA SERPL DL<=0.01 NG/ML-MCNC: 0.46 NG/ML (ref 0–3.5)
TRIGL SERPL-MCNC: 159 MG/DL

## 2025-01-30 ASSESSMENT — PAIN SCALES - GENERAL: PAINLEVEL_OUTOF10: NO PAIN (0)

## 2025-01-30 NOTE — PATIENT INSTRUCTIONS
Patient Education   Preventive Care Advice   This is general advice given by our system to help you stay healthy. However, your care team may have specific advice just for you. Please talk to your care team about your preventive care needs.  Nutrition  Eat 5 or more servings of fruits and vegetables each day.  Try wheat bread, brown rice and whole grain pasta (instead of white bread, rice, and pasta).  Get enough calcium and vitamin D. Check the label on foods and aim for 100% of the RDA (recommended daily allowance).  Lifestyle  Exercise at least 150 minutes each week  (30 minutes a day, 5 days a week).  Do muscle strengthening activities 2 days a week. These help control your weight and prevent disease.  No smoking.  Wear sunscreen to prevent skin cancer.  Have a dental exam and cleaning every 6 months.  Yearly exams  See your health care team every year to talk about:  Any changes in your health.  Any medicines your care team has prescribed.  Preventive care, family planning, and ways to prevent chronic diseases.  Shots (vaccines)   HPV shots (up to age 26), if you've never had them before.  Hepatitis B shots (up to age 59), if you've never had them before.  COVID-19 shot: Get this shot when it's due.  Flu shot: Get a flu shot every year.  Tetanus shot: Get a tetanus shot every 10 years.  Pneumococcal, hepatitis A, and RSV shots: Ask your care team if you need these based on your risk.  Shingles shot (for age 50 and up)  General health tests  Diabetes screening:  Starting at age 35, Get screened for diabetes at least every 3 years.  If you are younger than age 35, ask your care team if you should be screened for diabetes.  Cholesterol test: At age 39, start having a cholesterol test every 5 years, or more often if advised.  Bone density scan (DEXA): At age 50, ask your care team if you should have this scan for osteoporosis (brittle bones).  Hepatitis C: Get tested at least once in your life.  STIs (sexually  transmitted infections)  Before age 24: Ask your care team if you should be screened for STIs.  After age 24: Get screened for STIs if you're at risk. You are at risk for STIs (including HIV) if:  You are sexually active with more than one person.  You don't use condoms every time.  You or a partner was diagnosed with a sexually transmitted infection.  If you are at risk for HIV, ask about PrEP medicine to prevent HIV.  Get tested for HIV at least once in your life, whether you are at risk for HIV or not.  Cancer screening tests  Cervical cancer screening: If you have a cervix, begin getting regular cervical cancer screening tests starting at age 21.  Breast cancer scan (mammogram): If you've ever had breasts, begin having regular mammograms starting at age 40. This is a scan to check for breast cancer.  Colon cancer screening: It is important to start screening for colon cancer at age 45.  Have a colonoscopy test every 10 years (or more often if you're at risk) Or, ask your provider about stool tests like a FIT test every year or Cologuard test every 3 years.  To learn more about your testing options, visit:   .  For help making a decision, visit:   https://bit.ly/kj04085.  Prostate cancer screening test: If you have a prostate, ask your care team if a prostate cancer screening test (PSA) at age 55 is right for you.  Lung cancer screening: If you are a current or former smoker ages 50 to 80, ask your care team if ongoing lung cancer screenings are right for you.  For informational purposes only. Not to replace the advice of your health care provider. Copyright   2023 [facility]. All rights reserved. Clinically reviewed by the [facility]. [employer] 233716 - REV 01/24.  9 Ways to Cut Back on Drinking  Maybe you've found yourself drinking more alcohol than you'd prefer. If you want to cut back, here are some ideas to try.    Think before you drink.  Do you really want a drink,  "or is it just a habit? If you're used to having a drink at a certain time, try doing something else then.     Look for substitutes.  Find some no-alcohol drinks that you enjoy, like flavored seltzer water, tea with honey, or tonic with a slice of lime. Or try alcohol-free beer or \"virgin\" cocktails (without the alcohol).     Drink more water.  Use water to quench your thirst. Drink a glass of water before you have any alcohol. Have another glass along with every drink or between drinks.     Shrink your drink.  For example, have a bottle of beer instead of a pint. Use a smaller glass for wine. Choose drinks with lower alcohol content (ABV%). Or use less liquor and more mixer in cocktails.     Slow down.  It's easy to drink quickly and without thinking about it. Pay attention, and make each drink last longer.     Do the math.  Total up how much you spend on alcohol each month. How much is that a year? If you cut back, what could you do with the money you save?     Take a break.  Choose a day or two each week when you won't drink at all. Notice how you feel on those days, physically and emotionally. How did you sleep? Do you feel better? Over time, add more break days.     Count calories.  Would you like to lose some weight? For some people that's a good motivator for cutting back. Figure out how many calories are in each drink. How many does that add up to in a day? In a week? In a month?     Practice saying no.  Be ready when someone offers you a drink. Try: \"Thanks, I've had enough.\" Or \"Thanks, but I'm cutting back.\" Or \"No, thanks. I feel better when I drink less.\"   Current as of: November 15, 2023  Content Version: 14.3    2024 [email or web address].   Care instructions adapted under license by your healthcare professional. If you have questions about a medical condition or this instruction, always ask your healthcare professional. [employer] disclaims any warranty or liability for your use of this " information.

## 2025-01-30 NOTE — PROGRESS NOTES
"Preventive Care Visit  [facility]  Boo Coffman MD, Internal Medicine  Jan 30, 2025      Assessment & Plan     (Z00.00) Routine general medical examination at a health care facility  (primary encounter diagnosis)  Comment: Routine preventative physical.  He is due for colon cancer screening.  We discussed Cologuard versus colonoscopy.  He is also due for prostate cancer screening.  Plan:   Will refer him for colonoscopy.  Will check a PSA    (Z12.5) Screening for prostate cancer  Comment: Previous PSA was in the normal range.  We discussed the limitations of PSA testing and rectal exams.  Plan: PSA, screen given the previous normal PSA             (Z13.1) Screening for diabetes mellitus  Comment: No family history of diabetes  Plan: Glucose             (Z13.220) Screening for lipoid disorders  Comment: His previous cholesterol was significant for an LDL of 163.  He is not on any medications.  There is a family history of heart disease.  He is a non-smoker.  He is not diabetic.  He does not have hypertension.  Plan: Lipid panel reflex to direct LDL Fasting depending on his cholesterol level, we might consider putting him on a statin medication.             (Z12.11) Special screening for malignant neoplasms, colon  Comment: Previously did Cologuard testing.  Plan: Colonoscopy Screening  Referral             (Z82.49) Family history of coronary arteriosclerosis  Comment: Family history of coronary disease.  He has a history of hyperlipidemia.  He is not a smoker.  He does not have diabetes or hypertension.  Plan: CT Coronary Calcium Scan             (J30.2) Seasonal allergic rhinitis, unspecified trigger  Comment: Claritin  Plan: Continue Claritin    Patient has been advised of split billing requirements and indicates understanding: Yes        BMI  Estimated body mass index is 27.48 kg/m  as calculated from the following:    Height as of this encounter: 1.803 m (5' 11\").    Weight as " of this encounter: 89.4 kg (197 lb).   Weight management plan: Discussed healthy diet and exercise guidelines    Counseling  Appropriate preventive services were addressed with this patient via screening, questionnaire, or discussion as appropriate for fall prevention, nutrition, physical activity, Tobacco-use cessation, social engagement, weight loss and cognition.  Checklist reviewing preventive services available has been given to the patient.  Reviewed patient's diet, addressing concerns and/or questions.   The patient reports drinking more than 3 alcoholic drinks per day and/or more than 7 drhnks per week. The patient was counseled and given information about possible harmful effects of excessive alcohol intake.        Barbara Le is a 59 year old, presenting for the following:  Physical (Fasting for labs )        1/30/2025     7:12 AM   Additional Questions   Roomed by Ally WALTON   Accompanied by Self          HPI  59-year-old male presents for preventative physical.  He has no specific concerns.  He has some problems with allergies and takes Claritin.  He is due for Cologuard.  He is wondering if he should just have a colonoscopy.  He is also due for PSA testing.  His previous PSA was normal.  There is a family history of heart disease.  His cholesterol has been high in the past.  Will also try to get him set up for a CT coronary calcium test.    Health Care Directive  Patient does not have a Health Care Directive:      1/25/2025   General Health   How would you rate your overall physical health? Excellent   Feel stress (tense, anxious, or unable to sleep) Not at all         1/25/2025   Nutrition   Three or more servings of calcium each day? (!) I DON'T KNOW   Diet: Regular (no restrictions)   How many servings of fruit and vegetables per day? (!) 2-3   How many sweetened beverages each day? 0-1         1/25/2025   Exercise   Days per week of moderate/strenous exercise 4 days   Average minutes spent  exercising at this level 50 min         1/25/2025   Social Factors   Frequency of gathering with friends or relatives Twice a week   Worry food won't last until get money to buy more No   Food not last or not have enough money for food? No   Do you have housing? (Housing is defined as stable permanent housing and does not include staying ouside in a car, in a tent, in an abandoned building, in an overnight shelter, or couch-surfing.) Yes   Are you worried about losing your housing? No   Lack of transportation? No   Unable to get utilities (heat,electricity)? No         1/25/2025   Fall Risk   Fallen 2 or more times in the past year? No   Trouble with walking or balance? No          1/25/2025   Dental   Dentist two times every year? Yes         1/25/2025   TB Screening   Were you born outside of the [place]? No         Today's PHQ-2 Score:       1/30/2025     7:04 AM   PHQ-2 ( 1999 [employer])   Q1: Little interest or pleasure in doing things 0   Q2: Feeling down, depressed or hopeless 0   PHQ-2 Score 0    Q1: Little interest or pleasure in doing things Not at all   Q2: Feeling down, depressed or hopeless Not at all   PHQ-2 Score 0       Patient-reported           1/25/2025   Substance Use   Alcohol more than 3/day or more than 7/wk Yes   How often do you have a drink containing alcohol 4 or more times a week   How many alcohol drinks on typical day 1 or 2   How often do you have 5+ drinks at one occasion Less than monthly   Audit 2/3 Score 1   How often not able to stop drinking once started Never   How often failed to do what normally expected Never   How often needed first drink in am after a heavy drinking session Never   How often feeling of guilt or remorse after drinking Never   How often unable to remember what happened the night before Never   Have you or someone else been injured because of your drinking No   Has anyone been concerned or suggested you cut down on drinking No   TOTAL SCORE - AUDIT 5   Do you use any  other substances recreationally? No     Social History     Tobacco Use    Smoking status: Never    Smokeless tobacco: Former     Quit date: 1/1/2000   Vaping Use    Vaping status: Never Used   Substance Use Topics    Alcohol use: Yes    Drug use: No           1/25/2025   STI Screening   New sexual partner(s) since last STI/HIV test? No   Last PSA:   PSA   Date Value Ref Range Status   04/03/2021 0.40 0 - 4 ug/L Final     Comment:     Assay Method:  Chemiluminescence using Siemens Vista analyzer     Prostate Specific Antigen Screen   Date Value Ref Range Status   09/15/2023 0.38 0.00 - 3.50 ng/mL Final     ASCVD Risk   The 10-year ASCVD risk score (Juarez SOSA, et al., 2019) is: 7.8%    Values used to calculate the score:      Age: 59 years      Sex: Male      Is Non- : No      Diabetic: No      Tobacco smoker: No      Systolic Blood Pressure: 115 mmHg      Is BP treated: No      HDL Cholesterol: 51 mg/dL      Total Cholesterol: 241 mg/dL           Reviewed and updated as needed this visit by Provider                    Past Medical History:   Diagnosis Date    AR (allergic rhinitis)     spring & fall     Past Surgical History:   Procedure Laterality Date    HERNIA REPAIR, INGUINAL RT/LT  1967    right    VASECTOMY      wisdom teeth  1983     Current Outpatient Medications   Medication Sig Dispense Refill    loratadine (CLARITIN) 10 MG tablet Take 10 mg by mouth daily      Multiple Vitamin (MULTI-VITAMIN) per tablet Take 1 tablet by mouth daily.      Omega-3 Fatty Acids (FISH OIL) 1200 MG capsule Take 2 capsules by mouth daily.       Allergies   Allergen Reactions    Nkda [No Known Drug Allergy]          Review of Systems  Constitutional, HEENT, cardiovascular, pulmonary, GI, , musculoskeletal, neuro, skin, endocrine and psych systems are negative, except as otherwise noted.     Objective    Exam  /78   Pulse 60   Temp 96.8  F (36  C) (Tympanic)   Resp 16   Ht 1.803 m (5'  "11\")   Wt 89.4 kg (197 lb)   SpO2 96%   BMI 27.48 kg/m     Estimated body mass index is 27.48 kg/m  as calculated from the following:    Height as of this encounter: 1.803 m (5' 11\").    Weight as of this encounter: 89.4 kg (197 lb).    Physical Exam  GENERAL: alert and no distress  EYES: Eyes grossly normal to inspection, PERRL and conjunctivae and sclerae normal  HENT: ear canals and TM's normal, nose and mouth without ulcers or lesions  NECK: no adenopathy, no asymmetry, masses, or scars  RESP: lungs clear to auscultation - no rales, rhonchi or wheezes  CV: regular rate and rhythm, normal S1 S2, no S3 or S4, no murmur, click or rub, no peripheral edema  ABDOMEN: soft, nontender, no hepatosplenomegaly, no masses and bowel sounds normal  MS: no gross musculoskeletal defects noted, no edema  SKIN: no suspicious lesions or rashes  NEURO: Normal strength and tone, mentation intact and speech normal  PSYCH: mentation appears normal, affect normal/bright        Signed Electronically by: Boo Coffman MD    "

## 2025-02-20 ENCOUNTER — TELEPHONE (OUTPATIENT)
Dept: GASTROENTEROLOGY | Facility: CLINIC | Age: 60
End: 2025-02-20
Payer: COMMERCIAL

## 2025-02-20 NOTE — TELEPHONE ENCOUNTER
Pre assessment completed for upcoming procedure.   (Please see previous telephone encounter notes for complete details)    I called and spoke with patient      Procedure details:    Approximate time and facility location reviewed.   Patient is aware that endoscopy team will be calling about 2 days prior to confirm arrival time.    Designated  policy reviewed and that site requests drivers to check in and stay on campus. Instructed to have someone stay 6  hours post procedure.   *Disclaimer - please notify the [facility] RN GI staff with any  issues/concerns.    Medication review:    Medications reviewed. Please see supporting documentation below. Holding recommendations discussed (if applicable).       Prep for procedure:     Procedure prep instructions reviewed.        Any additional information needed:  I discussed sedation with patient. Patient feels comfortable moving forward with conscious sedation as scheduled.      Patient verbalized understanding and had no questions or concerns at this time.      Aurelia Syed LPN  Endoscopy Procedure Pre Assessment   [phone] option 3

## 2025-02-20 NOTE — TELEPHONE ENCOUNTER
Upon chart review patient reported having 3 drinks per day/more than 7 drinks per week. Drinking alcohol 4 days a week or more. Total AUDIT C score of 5 indicates increasing risk for alcohol use disorder. A message has been sent to the scoping provider to review if the patient needs to be rescheduled with [facility].  --------------------------------------------------------------------------------------------------------------------    Pre visit planning completed.      Procedure details:    Patient scheduled for Colonoscopy on 3/7/25.     Approximate arrival time: 0720. Procedure time 0805.   *Ensure patient is aware that endoscopy team will be calling about 2 days prior to procedure date to confirm arrival time as this may change.     Facility location: [facility] [place]; [address] [place], [place] [address], [address] Floor, [place] [address]. Check in location: 2nd Floor at Surgery desk.  *Disclaimer: Drivers are to check in with patient and stay on campus during procedure.     Sedation type: Conscious sedation     Pre op exam needed? No.    Indication for procedure: Special screening for malignant neoplasms, colon       Chart review:     Electronic implanted devices? No    Recent diagnosis of diverticulitis within the last 6 weeks? No      Medication review:    Diabetic? No    Anticoagulants? No    Weight loss medication/injectable? No GLP-1 medication per patient's medication list. Nursing to verify with pre-assessment call.    Other medication HOLDING recommendations:  N/A      Prep for procedure:     Bowel prep recommendation: Standard Miralax.   Due to: standard bowel prep    Procedure information and instructions sent via [employer]       Aurelia Syed LPN  Endoscopy Procedure Pre Assessment   [phone] option 3

## 2025-03-05 ENCOUNTER — TELEPHONE (OUTPATIENT)
Dept: GASTROENTEROLOGY | Facility: CLINIC | Age: 60
End: 2025-03-05
Payer: COMMERCIAL

## 2025-03-07 ENCOUNTER — HOSPITAL ENCOUNTER (OUTPATIENT)
Facility: AMBULATORY SURGERY CENTER | Age: 60
Discharge: HOME | End: 2025-03-07
Attending: INTERNAL MEDICINE | Admitting: INTERNAL MEDICINE
Payer: COMMERCIAL

## 2025-03-07 VITALS
OXYGEN SATURATION: 96 % | TEMPERATURE: 97.1 F | HEART RATE: 61 BPM | SYSTOLIC BLOOD PRESSURE: 108 MMHG | RESPIRATION RATE: 16 BRPM | DIASTOLIC BLOOD PRESSURE: 85 MMHG

## 2025-03-07 LAB — COLONOSCOPY: NORMAL

## 2025-03-07 PROCEDURE — 45378 DIAGNOSTIC COLONOSCOPY: CPT

## 2025-03-07 PROCEDURE — G8918 PT W/O PREOP ORDER IV AB PRO: HCPCS

## 2025-03-07 PROCEDURE — G8907 PT DOC NO EVENTS ON DISCHARG: HCPCS

## 2025-03-07 RX ORDER — ONDANSETRON 2 MG/ML
4 INJECTION INTRAMUSCULAR; INTRAVENOUS
Status: DISCONTINUED | OUTPATIENT
Start: 2025-03-07 | End: 2025-03-08 | Stop reason: HOSPADM

## 2025-03-07 RX ORDER — ONDANSETRON 4 MG/1
4 TABLET, ORALLY DISINTEGRATING ORAL EVERY 6 HOURS PRN
Status: DISCONTINUED | OUTPATIENT
Start: 2025-03-07 | End: 2025-03-08 | Stop reason: HOSPADM

## 2025-03-07 RX ORDER — FLUMAZENIL 0.1 MG/ML
0.2 INJECTION, SOLUTION INTRAVENOUS
Status: ACTIVE | OUTPATIENT
Start: 2025-03-07 | End: 2025-03-07

## 2025-03-07 RX ORDER — PROCHLORPERAZINE MALEATE 10 MG
10 TABLET ORAL EVERY 6 HOURS PRN
Status: DISCONTINUED | OUTPATIENT
Start: 2025-03-07 | End: 2025-03-08 | Stop reason: HOSPADM

## 2025-03-07 RX ORDER — LIDOCAINE 40 MG/G
CREAM TOPICAL
Status: DISCONTINUED | OUTPATIENT
Start: 2025-03-07 | End: 2025-03-08 | Stop reason: HOSPADM

## 2025-03-07 RX ORDER — NALOXONE HYDROCHLORIDE 0.4 MG/ML
0.2 INJECTION, SOLUTION INTRAMUSCULAR; INTRAVENOUS; SUBCUTANEOUS
Status: DISCONTINUED | OUTPATIENT
Start: 2025-03-07 | End: 2025-03-08 | Stop reason: HOSPADM

## 2025-03-07 RX ORDER — ONDANSETRON 2 MG/ML
4 INJECTION INTRAMUSCULAR; INTRAVENOUS EVERY 6 HOURS PRN
Status: DISCONTINUED | OUTPATIENT
Start: 2025-03-07 | End: 2025-03-08 | Stop reason: HOSPADM

## 2025-03-07 RX ORDER — NALOXONE HYDROCHLORIDE 0.4 MG/ML
0.4 INJECTION, SOLUTION INTRAMUSCULAR; INTRAVENOUS; SUBCUTANEOUS
Status: DISCONTINUED | OUTPATIENT
Start: 2025-03-07 | End: 2025-03-08 | Stop reason: HOSPADM

## 2025-03-07 RX ORDER — FENTANYL CITRATE 50 UG/ML
INJECTION, SOLUTION INTRAMUSCULAR; INTRAVENOUS PRN
Status: DISCONTINUED | OUTPATIENT
Start: 2025-03-07 | End: 2025-03-07 | Stop reason: HOSPADM

## 2025-03-07 NOTE — H&P
[facility] Anesthesia Pre-op History and Physical    Boo Medellin MRN# [id number]   Age: 59 year old YOB: 1965     Date of Exam 3/7/2025       Primary care provider: No Ref-Primary, Physician         Chief Complaint and/or Reason for Procedure:     CRC screening - first colonoscopy       Active problem list:     Patient Active Problem List    Diagnosis Date Noted    Chronic right shoulder pain 09/14/2023     Priority: Medium    Injury of right shoulder, initial encounter 09/14/2023     Priority: Medium    Bilateral hearing loss, unspecified hearing loss type 03/18/2021     Priority: Medium    CARDIOVASCULAR SCREENING; LDL GOAL LESS THAN 160 10/09/2012     Priority: Medium    High triglycerides 09/21/2012     Priority: Medium    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 No     Current Outpatient Medications   Medication Sig Dispense Refill    loratadine (CLARITIN) 10 MG tablet Take 10 mg by mouth daily      Multiple Vitamin (MULTI-VITAMIN) per tablet Take 1 tablet by mouth daily.      Omega-3 Fatty Acids (FISH OIL) 1200 MG capsule Take 2 capsules by mouth daily.       Current Facility-Administered Medications   Medication Dose Route Frequency Provider Last Rate Last Admin    lidocaine (LMX4) kit   Topical Q1H PRN Domonique, Kerry, DO        lidocaine 1 % 0.1-1 mL  0.1-1 mL Other Q1H PRN McBeath, Kerry, DO        ondansetron (ZOFRAN) injection 4 mg  4 mg Intravenous Once PRN McJosephath, Kerry, DO        sodium chloride (PF) 0.9% PF flush 3 mL  3 mL Intracatheter Q8H McBeath, Kerry, DO        sodium chloride (PF) 0.9% PF flush 3 mL  3 mL Intracatheter q1 min prn McBeath, Kerry, DO                 Allergies:      Allergies   Allergen Reactions    Nkda [No Known Drug Allergy]      Allergy to Latex? No  Allergy to  tape?   No  Intolerances:             Physical Exam:   All vitals have been reviewed  Patient Vitals for the past 8 hrs:   BP Temp Temp src Pulse Resp SpO2   03/07/25 0733 126/88 97.1  F (36.2  C) Temporal 62 16 96 %     No intake/output data recorded.  Lungs:   no increased work of breathing     Cardiovascular:   RRR             Lab / Radiology Results:            Anesthetic risk and/or ASA classification:     1  Kerry Youssef DO

## (undated) DEVICE — PAD CHUX UNDERPAD 23X24" 7136

## (undated) DEVICE — KIT ENDO FIRST STEP DISINFECTANT 200ML W/POUCH EP-4

## (undated) DEVICE — PREP CHLORAPREP 26ML TINTED ORANGE  260815

## (undated) DEVICE — LIFTER SURGICAL ASCENDO SUBMUCOSAL LIFT AGENT BX00712934

## (undated) RX ORDER — FENTANYL CITRATE 50 UG/ML
INJECTION, SOLUTION INTRAMUSCULAR; INTRAVENOUS
Status: DISPENSED
Start: 2025-03-07